# Patient Record
Sex: FEMALE | Race: OTHER | Employment: UNEMPLOYED | URBAN - METROPOLITAN AREA
[De-identification: names, ages, dates, MRNs, and addresses within clinical notes are randomized per-mention and may not be internally consistent; named-entity substitution may affect disease eponyms.]

---

## 2022-09-23 ENCOUNTER — HOSPITAL ENCOUNTER (INPATIENT)
Age: 32
LOS: 20 days | Discharge: HOME | DRG: 885 | End: 2022-10-13
Attending: PSYCHIATRY & NEUROLOGY | Admitting: PSYCHIATRY & NEUROLOGY
Payer: MEDICAID

## 2022-09-23 DIAGNOSIS — F31.2 BIPOLAR AFFECTIVE DISORDER, CURRENT EPISODE MANIC WITH PSYCHOTIC SYMPTOMS (HCC): Primary | ICD-10-CM

## 2022-09-23 PROCEDURE — 1240000000 HC EMOTIONAL WELLNESS R&B

## 2022-09-23 RX ORDER — OLANZAPINE 5 MG/1
5 TABLET ORAL EVERY 4 HOURS PRN
Status: DISCONTINUED | OUTPATIENT
Start: 2022-09-23 | End: 2022-09-28

## 2022-09-23 RX ORDER — POLYETHYLENE GLYCOL 3350 17 G
2 POWDER IN PACKET (EA) ORAL
Status: DISCONTINUED | OUTPATIENT
Start: 2022-09-23 | End: 2022-10-14 | Stop reason: HOSPADM

## 2022-09-23 RX ORDER — DIPHENHYDRAMINE HYDROCHLORIDE 50 MG/ML
50 INJECTION INTRAMUSCULAR; INTRAVENOUS EVERY 4 HOURS PRN
Status: DISCONTINUED | OUTPATIENT
Start: 2022-09-23 | End: 2022-10-02

## 2022-09-23 RX ORDER — HYDROXYZINE 50 MG/1
50 TABLET, FILM COATED ORAL 3 TIMES DAILY PRN
Status: DISCONTINUED | OUTPATIENT
Start: 2022-09-23 | End: 2022-09-23

## 2022-09-23 RX ORDER — MAGNESIUM HYDROXIDE/ALUMINUM HYDROXICE/SIMETHICONE 120; 1200; 1200 MG/30ML; MG/30ML; MG/30ML
30 SUSPENSION ORAL EVERY 6 HOURS PRN
Status: DISCONTINUED | OUTPATIENT
Start: 2022-09-23 | End: 2022-10-14 | Stop reason: HOSPADM

## 2022-09-23 RX ORDER — LORAZEPAM 1 MG/1
1 TABLET ORAL ONCE
Status: DISCONTINUED | OUTPATIENT
Start: 2022-09-23 | End: 2022-09-23

## 2022-09-23 RX ORDER — LORAZEPAM 2 MG/1
2 TABLET ORAL 2 TIMES DAILY
Status: DISCONTINUED | OUTPATIENT
Start: 2022-09-23 | End: 2022-10-13

## 2022-09-23 RX ORDER — TRAZODONE HYDROCHLORIDE 50 MG/1
50 TABLET ORAL NIGHTLY PRN
Status: DISCONTINUED | OUTPATIENT
Start: 2022-09-23 | End: 2022-09-24

## 2022-09-23 RX ORDER — ACETAMINOPHEN 325 MG/1
650 TABLET ORAL EVERY 4 HOURS PRN
Status: DISCONTINUED | OUTPATIENT
Start: 2022-09-23 | End: 2022-09-23

## 2022-09-23 RX ORDER — ACETAMINOPHEN 325 MG/1
650 TABLET ORAL EVERY 4 HOURS PRN
Status: DISCONTINUED | OUTPATIENT
Start: 2022-09-23 | End: 2022-10-14 | Stop reason: HOSPADM

## 2022-09-23 RX ORDER — IBUPROFEN 400 MG/1
400 TABLET ORAL EVERY 6 HOURS PRN
Status: DISCONTINUED | OUTPATIENT
Start: 2022-09-23 | End: 2022-09-23

## 2022-09-23 ASSESSMENT — LIFESTYLE VARIABLES
HOW OFTEN DO YOU HAVE A DRINK CONTAINING ALCOHOL: PATIENT DECLINED
HOW MANY STANDARD DRINKS CONTAINING ALCOHOL DO YOU HAVE ON A TYPICAL DAY: PATIENT DECLINED

## 2022-09-23 ASSESSMENT — PAIN SCALES - WONG BAKER: WONGBAKER_NUMERICALRESPONSE: 0

## 2022-09-23 NOTE — BH NOTE
It was reported to writer by Nimisha Avalos RN that [facility] called the facility, stating that they do not have any interpreters available.

## 2022-09-23 NOTE — BH NOTE
Sandra remains intermittently upset. She took a nap for about 1 hour this afternoon. She ate a few bites of her lunch. She will not talk to staff even using the . She yells that she doesn't want to be here. Pt became upset when making a call and it went to voice mail. She began to wail loudly. Staff unable to console her. Call placed to pts  and she accepted it. She began to yell at him. Once done her  spoke with writer and he states she is upset that she has no towels or shampoo in her room and she wants to shower. Everything was provided for personal care and pt was calm. She refused her clothing because they are \"not clean. \" Her  stated he would be in, but after visiting hours. He arrived after 1630 and he and pt are in [employer]. She yells at times, but is calm now. She continues to refuse her vital signs and assessment.

## 2022-09-23 NOTE — BH NOTE
Pt arrived via stretcher at 0945. She looked around suspiciously and began to cry. She speaks little [place] and ambulance personnel was using a translation neto to talk with her. She told them she was afraid to be here and began to cry. Attempts to calm the pt were ineffective. The  was utilized to speak to her. Through  #[id number] The pt was able to say that she didn't know why she was here. She states she was asleep when the police came and took her to the hospital. She states nothing is wrong with her. She did eventually talk some. She states she is from [facility], is  and has 2 boys age 6 and 15. She continues to say she didn't want to be here, but was more calm. She has been walking around the unit, not interactive. She was offered a breakfast tray and did sit to eat it. She declined to answer anymore questions and efforts are being made to get an  to come in and sit with her and assist with the admission process. She does deny voices or visual hallucinations. She denies any thoughts of harming  herself or others.

## 2022-09-23 NOTE — H&P
Hospital Medicine History & Physical      PCP: No primary care provider on file. Date of Admission: 9/23/2022    Date of Service: Pt seen/examined on 9/27/2022    Chief Complaint:  No chief complaint on file. History Of Present Illness: The patient is a 27 y.o. [address]  speaking female with pmhx of bipolar disorder who presented to [facility] by  for concern because patient has no slept in 3 days, has been off of her medications, and has been having hallucinations. Patient was seen and evaluated in the ED by the ED medical provider, patient was medically cleared for admission to [facility]. This note serves as an admission medical H&P. Tobacco use: None   ETOH use: None  Illicit drug use: None    Patient denies any medical complaints     Past Medical History:    No past medical history on file. Past Surgical History:    No past surgical history on file. Medications Prior to Admission:    Prior to Admission medications    Not on File       Allergies:  Patient has no allergy information on record. Social History:      TOBACCO:   has no history on file for tobacco use. ETOH:   has no history on file for alcohol use. Family History:   Positive as follows:    No family history on file. REVIEW OF SYSTEMS:       Constitutional: Negative for fever   HENT: Negative for sore throat   Eyes: Negative for redness   Respiratory: Negative  for dyspnea, cough   Cardiovascular: Negative for chest pain   Gastrointestinal: Negative for vomiting, diarrhea   Genitourinary: Negative for hematuria   Musculoskeletal: Negative for arthralgias   Skin: Negative for rash   Neurological: Negative for syncope    Hematological: Negative for easy bruising/bleeding   Psychiatric/Behavorial: Per psychiatry team evaluation     PHYSICAL EXAM:    There were no vitals taken for this visit. Gen: No distress. Alert. Eyes: PERRL. No sclera icterus. No conjunctival injection. Neck: No JVD. No Carotid Bruit. Trachea midline. GI: Non-tender. Non-distended. Skin: Warm and dry. No nodule on exposed extremities. No rash on exposed extremities. M/S: No cyanosis. No joint deformity. No clubbing. Moving all 4 extremities appropriately   Neuro: Awake. No focal neurologic deficit on exam.  Cranial nerves II through XII intact. Patient is able to ambulate without difficulty. Psych: Per psychiatry team evaluation   Patient refused to let me auscultate her heart, lungs, or abdomen     Labs in paper chart reviewed:  UDS + benzos   CMP: Potassium 3.3  CBC WNLs  Hgb A1C 5.3  HIV neg   TSH 1.2  T4 1.28    CULTURES  COVID not detected    EKG:  Not obtained     RADIOLOGY  No orders to display         ASSESSMENT/PLAN:  #Bipolar disorder   #Depression   #Psychosis   - per psychiatry team    #Hypokalemia   -will attempt to repeat BMP, patient has been refusing labs       Pt has no medical complaints at this time. They were informed that should a medical concern arise during their admission they may have I contact us.         Mabel Beavers   9/23/2022 12:25 PM
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
## 2022-09-23 NOTE — BH NOTE
Pt at nurses station yelling and throwing items off the counter top, including handouts, and then grabbed a meal tray and pushed it off the counter. Counter cleared for patient's and staff safety.

## 2022-09-23 NOTE — BH NOTE
Called [facility] ([phone]) to request in person , they state they will call back with an update as to when an  may be available.

## 2022-09-24 PROBLEM — E87.1 HYPONATREMIA: Status: ACTIVE | Noted: 2022-09-24

## 2022-09-24 PROBLEM — E87.6 HYPOKALEMIA: Status: ACTIVE | Noted: 2022-09-24

## 2022-09-24 PROBLEM — F31.2 BIPOLAR AFFECTIVE DISORDER, MANIC, SEVERE, WITH PSYCHOTIC BEHAVIOR (HCC): Status: ACTIVE | Noted: 2022-09-24

## 2022-09-24 PROCEDURE — 1240000000 HC EMOTIONAL WELLNESS R&B

## 2022-09-24 RX ORDER — QUETIAPINE 200 MG/1
200 TABLET, FILM COATED, EXTENDED RELEASE ORAL NIGHTLY
Status: DISCONTINUED | OUTPATIENT
Start: 2022-09-24 | End: 2022-10-01

## 2022-09-24 ASSESSMENT — PAIN SCALES - WONG BAKER: WONGBAKER_NUMERICALRESPONSE: 0

## 2022-09-24 NOTE — PROGRESS NOTES
Behavioral Services  Medicare Certification Upon Admission    I certify that this patient's inpatient psychiatric hospital admission is medically necessary for:    [x] (1) Treatment which could reasonably be expected to improve this patient's condition,       [x] (2) Or for diagnostic study;     AND     [x](2) The inpatient psychiatric services are provided while the individual is under the care of a physician and are included in the individualized plan of care.     Estimated length of stay/service 8-10 days    Plan for post-hospital care incomplete     Electronically signed by Ascencion Nicholas MD on 9/24/2022 at 10:53 AM
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
## 2022-09-24 NOTE — BH NOTE
Writer and other staff made multiple attempts to communicate with patient through tele- and live  with no success. Patient refuses to eat or drink, or take medication. Has had a few sips of liquid. Has wrapped self in blanket and remained self isolative on milieu. Refuses to speak with family.   Writer unable to complete assessments or interview with patient due to patient's refusal.

## 2022-09-24 NOTE — FLOWSHEET NOTE
Nurse approached patient upon beginning of the shift and patient walked away and would not make any eye-contact presenting guarded and watchful with a staring expression. Patient does not appear to be responding to external stimuli. Patient does appear very paranoid and easily startled by any contact. She does not initiate  social interaction with staff or peers. At a later time this nurse attempted to give the patient her scheduled Ativan medication. The patient said No in a loud voice and walked a way. Thirty minutes later the nurse attempted to administer the po Ativan and patient turned her head and said no. Patient is seen sitting on the periphery of the dayroom with a blanket over her head. This nurse consulted with charge nurse regarding her care and the  resource was utilized.  # [id number] spoke at length with the patient in [place] which sounded very intense. The patient would speak rapidly and loudly at times in an angry tone at the  sometimes pushing the machine on wheels away from her. The patient would not agree to take the medication. The attempt to communicated was aborted as the patient seemed to be escalating with the stimulus. The  shared that he thought he was making progress \"then she flipped on me talking about the police and the hospital accusing her of stealing\". He shared that he thought she seemed threatened but it was not clear to him what was exactly bothering her. However, he said that her communication was basically a flight of ideas that he was having a hard time following even with understanding [place]. Patient continues to sit quietly in the dayroom with a blanket over her head. Close observation and monitoring continues.
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
## 2022-09-24 NOTE — H&P
Psychiatry History and Physical     Admission Date:    9/23/2022    Identification: domiciled, , and unemployed German speaking 30yo with a chart history of bipolar I disorder and psychotic symptoms who was brought to  [facility]  under a Statement of Belief by police after her  called the [facility] because of worsening psychotic behavior. SOI:  patient. Record review (some [facility] and  record; otherwise none). CC: psychotic symptoms     HPI:   According to the [facility] notes:  Her  called reporting that the patient had not slept in days, was paranoid and fearful, and hallucinating. He told them she was afraid people may harm her. He said she had been taking herbal supplements from her mother but no other treatment for several weeks. She was unwilling to go with him to the ER so he called the [facility]. Ultimately she was brought to  by police. When the  [facility] tried to meet with her she became fearful and declined to speak with them. She tried to prevent her  from speaking with them too. She became tearful and cried \"no, no, no!!\" She was \"selectively mute\" and \"showing signs of catatonia. \"    According to our admitting RN note:  Pt arrived via stretcher at 0945. She looked around suspiciously and began to cry. She speaks little [place] and ambulance personnel was using a translation neto to talk with her. She told them she was afraid to be here and began to cry. Attempts to calm the pt were ineffective. The  was utilized to speak to her. Through  #[id number] The pt was able to say that she didn't know why she was here. She states she was asleep when the police came and took her to the hospital. She states nothing is wrong with her. She did eventually talk some. She states she is from [facility], is  and has 2 boys age 6 and 15. She continues to say she didn't want to be here, but was more calm. She has been walking around the unit, not interactive.  She was offered a breakfast tray and did sit to eat it. She declined to answer anymore questions and efforts are being made to get an  to come in and sit with her and assist with the admission process. She does deny voices or visual hallucinations. She denies any thoughts of harming  herself or others. Later in the day yesterday:  Ghislaine Sands remains intermittently upset. She took a nap for about 1 hour this afternoon. She ate a few bites of her lunch. She will not talk to staff even using the . She yells that she doesn't want to be here. Pt became upset when making a call and it went to voice mail. She began to wail loudly. Staff unable to console her. Call placed to pts  and she accepted it. She began to yell at him. Once done her  spoke with writer and he states she is upset that she has no towels or shampoo in her room and she wants to shower. Everything was provided for personal care and pt was calm. She refused her clothing because they are \"not clean. \" Her  stated he would be in, but after visiting hours. He arrived after 1630 and he and pt are in [employer]. She yells at times, but is calm now. She continues to refuse her vital signs and assessment. I attempted to meet with her yesterday and this morning. She declined to speak with me, looked fearful, and walked away. She has been behavior stable since yesterday afternoon. She has not accepted medication since admission. 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 Past Medical History:  Past Medical History:   Diagnosis Date    Incomplete miscarriage 2016    Paranoia (psychosis) ([facility] [address] 2021    Spontaneous  in first trimester 2016    Per  [facility] Hx     No known chronic conditions, TBIs, or seizures     Home Medications:  Unknown.     Chemical Dependency History:   Unknown. Her UDS in the ER was positive for benzos (she had been given some). Family Mental Health Hx:    Unknown     Social Hx:   Lives with , 2 children, and brother. ROS:  no spontaneous complaints. PE:    BP (!) 125/102 Comment: Pt moving and yelling at spouse  Pulse 81   Temp 98.1 °F (36.7 °C) (Oral)   Resp 18   LMP  (LMP Unknown)       Motor / Gait:  normal gait and station  AIMS: 0    Mental Status Examination:    Appearance: fair hygiene  Behavior/Attitude toward examiner:  fearful   Speech: poverty   Mood:  elevated  Affect:  elevated  Thought processes:  unable to assess   Thought Content: + paranoid. Perceptions: ? AVH, ? RTIS  Attention: impaired  Abstraction: unable to assess   Cognition: unable to assess   Insight: impaired  Judgment: impaired    LAB: [facility] labs show a BMP with K at 3.3, otherwise WNL. UDS positive for benzos. TSH and T4 WNL. HIV negative. CBC WNL. LFTs WNL. COVID negative. Formulation: domiciled, , and unemployed Maldivian speaking 30yo with a chart history of bipolar I disorder and psychotic symptoms who was brought to [facility]  under a Statement of Belief by police after her  called the [facility] because of worsening psychotic behavior. She was transferred here on a hold for further evaluation, stabilization, and treatment    Dx:   Primary Psychiatric (DSM V) Diagnosis: Bipolar affective disorder, current episode manic with psychotic symptoms   Secondary Psychiatric (DSM V) Diagnoses: none  Chemical Dependency Diagnoses: none known  Medical: hypokalemia     Plan:  1. Admit to inpatient psychiatry for further evaluation, stabilization, and treatment. 2. Order ativan 2mg BID and quetiapine XR 200mg QHS. Order q15min checks for safety, programming, and prn medication for anxiety, agitation, or insomnia. 3. Hospitalist consult for admission. 4. Request complete [facility] records. Collateral from  Alberteen Nissen - [phone].   5. ELOS 8-10 days. Admitted on a hold that expires on Tuesday 9/27. Spent > 70 minutes face to face with patient of which >50% was spent counseling and providing education regarding diagnosis, treatment options, and prognosis.     Daniel Bejarano MD  Staff Psychiatrist

## 2022-09-25 PROCEDURE — 1240000000 HC EMOTIONAL WELLNESS R&B

## 2022-09-25 NOTE — BH NOTE
Patient initially in shower afraid to come out then returned to bed. Patient has refused drinks and food even with 's encouragement. Patient suspicious and fearful.  reported that sometimes Mom can encourage her but patient refuses to talk to Mom at this time.

## 2022-09-25 NOTE — PLAN OF CARE
Problem: Psychosis  Goal: Will report no hallucinations or delusions  Description: INTERVENTIONS:  1. Administer medication as  ordered  2. Assist with reality testing to support increasing orientation  3. Assess if patient's hallucinations or delusions are encouraging self harm or harm to others and intervene as appropriate  9/25/2022 1240 by Eb Gilbert RN  Outcome: Not Progressing     Problem: Sleep Disturbance  Goal: Will exhibit normal sleeping pattern  Description: INTERVENTIONS:  1. Administer medication as ordered  2. Decrease environmental stimuli, including noise, as appropriate  3. Discourage social isolation and naps during the day  9/25/2022 1240 by Eb Gilbert RN  Outcome: Progressing    Problem: Pain  Goal: Verbalizes/displays adequate comfort level or baseline comfort level  9/25/2022 1240 by Eb Gilbert RN  Outcome: Progressing     Problem: Safety - Adult  Goal: Free from fall injury  9/25/2022 1240 by Eb Gilbert RN  Outcome: Progressing     Problem: Sleep Disturbance  Goal: Will exhibit normal sleeping pattern  Description: INTERVENTIONS:  1. Administer medication as ordered  2. Decrease environmental stimuli, including noise, as appropriate  3. Discourage social isolation and naps during the day  9/25/2022 1240 by Eb Gilbert RN  Outcome: Progressing    Patient is very minimally interactive with staff. Patient refusing meals and fluids. Patient pacing unit this morning. Patient been resting in bed since 0900 this morning with blanket over her head. Patient refusing medications, vitals, and assessment. Patient disclosed to  that she would like to talk to her father. Unable to give his contact information and she didn't know if he was still alive.

## 2022-09-25 NOTE — BH NOTE
Purposeful Rounding    Patient Location: Patient room    Patient willing to engage in conversation: No - patient resting    Presentation/behavior: N/A    Affect: N/A    Concerns reported: N/A    PRN medications given: N/A    Environmental assessment: No safety hazards noted    Fall prevention interventions in place: Lighting appropriate, Room free of clutter, and Clear path to bathroom
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
## 2022-09-25 NOTE — PLAN OF CARE
Problem: Psychosis  Goal: Will report no hallucinations or delusions  Description: INTERVENTIONS:  1. Administer medication as  ordered  2. Assist with reality testing to support increasing orientation  3. Assess if patient's hallucinations or delusions are encouraging self harm or harm to others and intervene as appropriate  Outcome: Not Progressing     Problem: Sleep Disturbance  Goal: Will exhibit normal sleeping pattern  Description: INTERVENTIONS:  1. Administer medication as ordered  2. Decrease environmental stimuli, including noise, as appropriate  3. Discourage social isolation and naps during the day  Outcome: Not Progressing   Pt speaks no English that staff is aware of. Even with the , Sydni Carpio, pt answers very little. Pt voicing no c/o physical pain. Pt does not appear to be in physical pain. FLACC 0. Pt has demonstrated no fall risk. Her gait has been steady all the time she has been walking on the unit. Psychosis     Pt has been at times appearing very preoccupied. Pt would stare into this nurses' eyes like she was trying to convey something, but would not answer much from the . On days today she did tell Sydni Carpio that she wanted to leave here and was afraid. She refused all meds. She has not been eating or drinking this shift when observed. Pt stood at her window for about an hour and a half. She then finally laid down in bed. She had a cover over her head and torso but did not cover her face. Pt given clean panties and pads. Will make sure that bottled water and snacks stay in room.

## 2022-09-26 LAB
GLUCOSE BLD-MCNC: 80 MG/DL (ref 70–99)
PERFORMED ON: NORMAL

## 2022-09-26 PROCEDURE — 1240000000 HC EMOTIONAL WELLNESS R&B

## 2022-09-26 ASSESSMENT — PAIN SCALES - WONG BAKER: WONGBAKER_NUMERICALRESPONSE: 0

## 2022-09-26 NOTE — PLAN OF CARE
Attempted to see patient, she refused at this time.  Will reattempt at a later date     Reford Rishi, [id number]  9/23/2022

## 2022-09-26 NOTE — PLAN OF CARE
Pt has been in bed asleep most of the day. She appeared to be holding her eyes closed at times. When writer could not get her to wake up, a fingerstick was done. The reading was 80 mg/dl. She didn't open her eyes with the lancet stick, but scowled when staff touched the sole of her foot. Later she was noted to be crying in her room. Writer went in and pt allowed her hair to be stroked and for writer to hold her hand. When she stopped crying, writer took her tray in. She refused to eat. The tray was left with her and she threw it on the floor. She got up and went to the bathroom. When up she was noted to be on her period. She refused to change her gown. She was given panties and dulce pads, but threw them into the anderson. Several staff have tried to arevalo her gown and she refuses. When given a clean gown, she threw it into the anderson. We were unable to get an  for day shift, but will have one this evening about 1930. Call placed to her  and he was unable to come in this evening. [employer] phone taken to pt and she refused to speak to her . She again refused the iPad . The  Gadiel Guillermo just showed up. The pt is wandering the unit. She got a milk from the fridge, opened it, then put it back. She eventually got an apple juice, but has not attempted to open it. She was told about her gown and she refuses to change, she shrugged her shoulders. She was offered other food, and snacks. She has refused so far. Gadiel Li is walking around the unit and talking to her at this time. He will encourage her to eat and drink and to change her gown. She has not been noted to be RTIS. Problem: Pain  Goal: Verbalizes/displays adequate comfort level or baseline comfort level  9/26/2022 1843 by Gilson Chavez RN  Outcome: Not Progressing  9/26/2022 1020 by Marijean Mohs, RN  Outcome: Progressing     Problem: Psychosis  Goal: Will report no hallucinations or delusions  Description: INTERVENTIONS:  1. Administer medication as  ordered  2. Assist with reality testing to support increasing orientation  3. Assess if patient's hallucinations or delusions are encouraging self harm or harm to others and intervene as appropriate  9/26/2022 1843 by El Sethi RN  Outcome: Not Progressing  9/26/2022 1020 by Guru Mascorro RN  Outcome: Not Progressing     Problem: Sleep Disturbance  Goal: Will exhibit normal sleeping pattern  Description: INTERVENTIONS:  1. Administer medication as ordered  2. Decrease environmental stimuli, including noise, as appropriate  3.  Discourage social isolation and naps during the day  9/26/2022 1843 by El Sethi RN  Outcome: Not Progressing  9/26/2022 1020 by Guru Mascorro RN  Outcome: Progressing

## 2022-09-26 NOTE — PROGRESS NOTES
Department of Psychiatry  AttendingProgress Note  Chief Complaint: psychosis  Estela Blevins was in bed when I met with her. She made minimal eye contact and had a blanket pulled up to her mid face. She made no effort to engage and appeared to be anxious. Is not willing to use the  screen. Awaiting an  on the unit. No outbursts since last Friday 9/23. Patient's chart was reviewed and collaborated with  about the treatment plan. SUBJECTIVE:    Patient is feeling unchanged. Suicidal ideation:  DEVIN. Patient  DEVIN  medication side effects. ROS: Patient has new complaints: no  Sleeping adequately:  No: DEVIN   Appetite adequate: Yes  Attending groups: No:   Visitors:No    OBJECTIVE    Physical  VITALS:  BP (!) 125/102 Comment: Pt moving and yelling at spouse  Pulse 81   Temp 98.1 °F (36.7 °C) (Oral)   Resp 16   LMP  (LMP Unknown)     Mental Status Examination:  Patients appearance was hospital attire. Thoughts are  DEVIN . Homicidal ideations DEVIN. No abnormal movements, tics or mannerisms. Memory DEVIN Aims 0. Concentration Unable to Assess. Alert and oriented X 4. Insight and Judgement DEVIN. Patient was uncooperative. Patient gait normal. Mood labile, affect labile affect Hallucinations DEVIN, suicidal ideations no specific plan to harm self Speech DEVIN  Data  Labs:   No results found for any previous visit.             Medications  Current Facility-Administered Medications: QUEtiapine (SEROQUEL XR) extended release tablet 200 mg, 200 mg, Oral, Nightly  magnesium hydroxide (MILK OF MAGNESIA) 400 MG/5ML suspension 30 mL, 30 mL, Oral, Daily PRN  nicotine polacrilex (COMMIT) lozenge 2 mg, 2 mg, Oral, Q1H PRN  aluminum & magnesium hydroxide-simethicone (MAALOX) 200-200-20 MG/5ML suspension 30 mL, 30 mL, Oral, Q6H PRN  OLANZapine (ZYPREXA) tablet 5 mg, 5 mg, Oral, Q4H PRN **OR** OLANZapine (ZYPREXA) 10 mg in sterile water 2 mL injection, 10 mg, IntraMUSCular, Q4H PRN  diphenhydrAMINE (BENADRYL) injection 50 mg, 50 mg, IntraMUSCular, Q4H PRN  acetaminophen (TYLENOL) tablet 650 mg, 650 mg, Oral, Q4H PRN  LORazepam (ATIVAN) tablet 2 mg, 2 mg, Oral, BID    ASSESSMENT AND PLAN    Principal Problem:    Bipolar affective disorder, current episode manic with psychotic symptoms (HCC)  Active Problems:    Hypokalemia  Resolved Problems:    * No resolved hospital problems. *       1. Patient s symptoms   show no change  2. Probable discharge is unclear  3. Discharge planning is incomplete  4. Suicidal ideation is  DEVIN  5. Total time with patient was 40 minutes and more than 50 % of that time was spent counseling the patient on their symptoms, treatment and expected goals.

## 2022-09-26 NOTE — BH NOTE
Pt sleeping. Met with , Deepak Stock with , [facility]. Deepak Stock seems to speak only a few words of English, but was very cooperative and seemed to want to give info to help his wife.  said Hyun Maza had been off her meds for 5-6 months. Pt has two earlier episodes of apparent psychosis. These seemed centered on postpartum periods, but pt also had an . Pt would get very paranoid and fearful about nothing that was evident. This episode seemed to start about a week ago pt started to get very clingy. States his wife has abandonment issues. (This may be in part that father to whom she was close walked out on their family about 21 years ago.) Naomy said when this happened in the past, she seems to come around after a brief time and starts taking her meds. Asked about her favorite foods, and he said Tacos, [place] food, Coke and Gatorade. States pt has hx of suicidal behaviors or threats. No past attempts. Deepak Stock gave us several phone numbers. Psychiatrist on Bette Crocker with   [phone] # [phone]  Pt's mother Syd Oviedo" in [address]  Mauricio Danger only [address] speaking  [phone]  Bandar speaks a little English   [phone]  The , Joann Owen has been here left 2 of his numbers [phone] and [phone]. He could come back Monday afternoon when able. We would have to go through his agency.

## 2022-09-26 NOTE — PLAN OF CARE
take medication as prescribed, eat 75% of meals, attend groups, participate in milieu activities, participate in treatment team and care planning for discharge and follow up.            Mayra Cummins RN

## 2022-09-26 NOTE — PLAN OF CARE
This is night shift 9-25-22 to 9-26-22  Problem: Pain  Goal: Verbalizes/displays adequate comfort level or baseline comfort level  Outcome: Progressing     Problem: Safety - Adult  Goal: Free from fall injury  Outcome: Progressing     Problem: Psychosis  Goal: Will report no hallucinations or delusions  Description: INTERVENTIONS:  1. Administer medication as  ordered  2. Assist with reality testing to support increasing orientation  3. Assess if patient's hallucinations or delusions are encouraging self harm or harm to others and intervene as appropriate  Outcome: Not Progressing     Problem: Sleep Disturbance  Goal: Will exhibit normal sleeping pattern  Description: INTERVENTIONS:  1. Administer medication as ordered  2. Decrease environmental stimuli, including noise, as appropriate  3. Discourage social isolation and naps during the day  Outcome: Progressing     Problem: Psychosis  Goal: Will report no hallucinations or delusions  Description: INTERVENTIONS:  1. Administer medication as  ordered  2. Assist with reality testing to support increasing orientation  3. Assess if patient's hallucinations or delusions are encouraging self harm or harm to others and intervene as appropriate  Outcome: Not Progressing   Pt has had no falls and appears to have a steady gait. Pt says very little. She appears at times to be responding but answers few questions and then an  must be present. Pt remains fearful though this seems to be getting better. Pt was tearful for a while but we were unable to find out why. Pt did not eat her dinner tray. She did drink about 3/4 of a bottled water, that was brought with a cup of ice. Pt readily drank the water over the ice that was brought to her. Did not see pt eating this shift, but does have snacks in room. Over all pt seems slightly more responsive. She refused VS early in shift and did not want to take HS meds. She did seem a bit more comfortable in her surroundings.

## 2022-09-27 LAB
ANION GAP SERPL CALCULATED.3IONS-SCNC: 18 MMOL/L (ref 3–16)
BASOPHILS ABSOLUTE: 0.2 K/UL (ref 0–0.2)
BASOPHILS RELATIVE PERCENT: 2.7 %
BUN BLDV-MCNC: 13 MG/DL (ref 7–20)
CALCIUM SERPL-MCNC: 9.6 MG/DL (ref 8.3–10.6)
CHLORIDE BLD-SCNC: 105 MMOL/L (ref 99–110)
CO2: 17 MMOL/L (ref 21–32)
CREAT SERPL-MCNC: <0.5 MG/DL (ref 0.6–1.1)
EOSINOPHILS ABSOLUTE: 0 K/UL (ref 0–0.6)
EOSINOPHILS RELATIVE PERCENT: 0.6 %
GFR AFRICAN AMERICAN: >60
GFR NON-AFRICAN AMERICAN: >60
GLUCOSE BLD-MCNC: 95 MG/DL (ref 70–99)
HCT VFR BLD CALC: 40 % (ref 36–48)
HEMOGLOBIN: 13.3 G/DL (ref 12–16)
LYMPHOCYTES ABSOLUTE: 1.4 K/UL (ref 1–5.1)
LYMPHOCYTES RELATIVE PERCENT: 17.4 %
MCH RBC QN AUTO: 29.3 PG (ref 26–34)
MCHC RBC AUTO-ENTMCNC: 33.3 G/DL (ref 31–36)
MCV RBC AUTO: 87.9 FL (ref 80–100)
MONOCYTES ABSOLUTE: 0.5 K/UL (ref 0–1.3)
MONOCYTES RELATIVE PERCENT: 6.2 %
NEUTROPHILS ABSOLUTE: 5.7 K/UL (ref 1.7–7.7)
NEUTROPHILS RELATIVE PERCENT: 73.1 %
PDW BLD-RTO: 13.1 % (ref 12.4–15.4)
PLATELET # BLD: 315 K/UL (ref 135–450)
PMV BLD AUTO: 7.7 FL (ref 5–10.5)
POTASSIUM REFLEX MAGNESIUM: 4.1 MMOL/L (ref 3.5–5.1)
RBC # BLD: 4.55 M/UL (ref 4–5.2)
SODIUM BLD-SCNC: 140 MMOL/L (ref 136–145)
WBC # BLD: 7.8 K/UL (ref 4–11)

## 2022-09-27 PROCEDURE — 85025 COMPLETE CBC W/AUTO DIFF WBC: CPT

## 2022-09-27 PROCEDURE — 2500000003 HC RX 250 WO HCPCS: Performed by: PSYCHIATRY & NEUROLOGY

## 2022-09-27 PROCEDURE — 1240000000 HC EMOTIONAL WELLNESS R&B

## 2022-09-27 PROCEDURE — 80048 BASIC METABOLIC PNL TOTAL CA: CPT

## 2022-09-27 PROCEDURE — 2580000003 HC RX 258: Performed by: PSYCHIATRY & NEUROLOGY

## 2022-09-27 PROCEDURE — 99221 1ST HOSP IP/OBS SF/LOW 40: CPT

## 2022-09-27 PROCEDURE — 36415 COLL VENOUS BLD VENIPUNCTURE: CPT

## 2022-09-27 RX ADMIN — OLANZAPINE 10 MG: 10 INJECTION, POWDER, LYOPHILIZED, FOR SOLUTION INTRAMUSCULAR at 18:31

## 2022-09-27 NOTE — PLAN OF CARE
Problem: Safety - Adult  Goal: Free from fall injury  9/26/2022 2040 by Alis Carias RN  Outcome: Not Progressing     Problem: Psychosis  Goal: Will report no hallucinations or delusions  Description: INTERVENTIONS:  1. Administer medication as  ordered  2. Assist with reality testing to support increasing orientation  3. Assess if patient's hallucinations or delusions are encouraging self harm or harm to others and intervene as appropriate  9/26/2022 2040 by Alis Carias RN  Outcome: Not Progressing         Problem: Psychosis  Goal: Will report no hallucinations or delusions  Description: INTERVENTIONS:  1. Administer medication as  ordered  2. Assist with reality testing to support increasing orientation  3. Assess if patient's hallucinations or delusions are encouraging self harm or harm to others and intervene as appropriate  9/26/2022 2040 by Alis Carias RN  Outcome: Not Progressing  9/26/2022 1843 by Avery Mirza RN  Outcome: Not Progressing  9/26/2022 1020 by Angela Chen RN  Outcome: Not Progressing     Problem: Sleep Disturbance  Goal: Will exhibit normal sleeping pattern  Description: INTERVENTIONS:  1. Administer medication as ordered  2. Decrease environmental stimuli, including noise, as appropriate  3.  Discourage social isolation and naps during the day  9/26/2022 1843 by Avery Mirza RN  Outcome: Not Progressing

## 2022-09-27 NOTE — BH NOTE
Writer filed probate on this patient.     Writer sent medical records request to [facility] DR EFREN LUJAN at [phone]
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
## 2022-09-27 NOTE — PROGRESS NOTES
Department of Psychiatry  AttendingProgress Note  Chief Complaint: catatonia  Mary Gee continues to resist tx. She refused medication and refused to allow hospitalist to do an eval. I also sat in with the green screen and hospitalist and Mary Gee would shake her head yes or no to quesitons. When I asked her about taking medication she shook her head, to imply that she agree to take meds but then when presented with it , she refused to take Ativan. She appeared to understand the . Later she walked around the unit with his arm out to her side and hands extended. Covered with a blanket. Is  not observed to be eating or drinking although there is food in her room. Refuses blood work so it's difficult to assess po intake. Appears to be walking freely about the unit . Is not confined to bed. Will pursue probate and forced med hearing. Patient's chart was reviewed and collaborated with  about the treatment plan. SUBJECTIVE:    Patient is feeling  PEPE . Suicidal ideation:  PEPE. Patient  PEPE  medication side effects. ROS: Patient has new complaints: no  Sleeping adequately:  Yes   Appetite adequate: No:   Attending groups: No:   Visitors:No    OBJECTIVE    Physical  VITALS:  BP (!) 125/102 Comment: Pt moving and yelling at spouse  Pulse 81   Temp 98.1 °F (36.7 °C) (Oral)   Resp 16   LMP  (LMP Unknown)     Mental Status Examination:  Patients appearance was . Thoughts are Blocking. Homicidal ideations PEPE. No abnormal movements, tics or mannerisms. Memory pepe Aims 0. Concentration Unable to Assess. Alert and oriented X 4. Insight and Judgement pepe. Patient was uncooperative.  Patient gait normal. Mood labile, affect labile affect Hallucinations PEPE, suicidal ideations no specific plan to harm self Speech no  speech  Data  Labs:   Admission on 09/23/2022   Component Date Value Ref Range Status    POC Glucose 09/26/2022 80  70 - 99 mg/dl Final    Performed on 09/26/2022 ACCU-CHEK Final            Medications  Current Facility-Administered Medications: QUEtiapine (SEROQUEL XR) extended release tablet 200 mg, 200 mg, Oral, Nightly  magnesium hydroxide (MILK OF MAGNESIA) 400 MG/5ML suspension 30 mL, 30 mL, Oral, Daily PRN  nicotine polacrilex (COMMIT) lozenge 2 mg, 2 mg, Oral, Q1H PRN  aluminum & magnesium hydroxide-simethicone (MAALOX) 200-200-20 MG/5ML suspension 30 mL, 30 mL, Oral, Q6H PRN  OLANZapine (ZYPREXA) tablet 5 mg, 5 mg, Oral, Q4H PRN **OR** OLANZapine (ZYPREXA) 10 mg in sterile water 2 mL injection, 10 mg, IntraMUSCular, Q4H PRN  diphenhydrAMINE (BENADRYL) injection 50 mg, 50 mg, IntraMUSCular, Q4H PRN  acetaminophen (TYLENOL) tablet 650 mg, 650 mg, Oral, Q4H PRN  LORazepam (ATIVAN) tablet 2 mg, 2 mg, Oral, BID    ASSESSMENT AND PLAN    Principal Problem:    Bipolar affective disorder, current episode manic with psychotic symptoms (HCC)  Active Problems:    Hypokalemia  Resolved Problems:    * No resolved hospital problems. *       1. Patient s symptoms   show no change  2. Probable discharge is next week  3. Discharge planning is incomplete  4. Suicidal ideation is  DEVIN  5. Total time with patient was 40 minutes and more than 50 % of that time was spent counseling the patient on their symptoms, treatment and expected goals.

## 2022-09-28 PROCEDURE — 2500000003 HC RX 250 WO HCPCS: Performed by: PSYCHIATRY & NEUROLOGY

## 2022-09-28 PROCEDURE — 2580000003 HC RX 258: Performed by: PSYCHIATRY & NEUROLOGY

## 2022-09-28 PROCEDURE — 1240000000 HC EMOTIONAL WELLNESS R&B

## 2022-09-28 RX ORDER — DIAZEPAM 5 MG/ML
10 INJECTION, SOLUTION INTRAMUSCULAR; INTRAVENOUS EVERY 4 HOURS PRN
Status: DISCONTINUED | OUTPATIENT
Start: 2022-09-28 | End: 2022-10-02

## 2022-09-28 RX ORDER — OLANZAPINE 10 MG/1
10 TABLET ORAL EVERY 4 HOURS PRN
Status: DISCONTINUED | OUTPATIENT
Start: 2022-09-28 | End: 2022-10-02

## 2022-09-28 RX ADMIN — OLANZAPINE 10 MG: 10 INJECTION, POWDER, LYOPHILIZED, FOR SOLUTION INTRAMUSCULAR at 00:24

## 2022-09-28 ASSESSMENT — PAIN SCALES - WONG BAKER: WONGBAKER_NUMERICALRESPONSE: 0

## 2022-09-28 NOTE — PLAN OF CARE
Accessed Pt with use of  and Pt denied all. Pt did not want to talk much and refused all meds. Pt slept at beginning of shift. Pt in & out of room for most of the remaining shift. Problem: Safety - Adult  Goal: Free from fall injury  Outcome: Progressing     Problem: Pain  Goal: Verbalizes/displays adequate comfort level or baseline comfort level  Outcome: Not Progressing     Problem: Psychosis  Goal: Will report no hallucinations or delusions  Description: INTERVENTIONS:  1. Administer medication as  ordered  2. Assist with reality testing to support increasing orientation  3. Assess if patient's hallucinations or delusions are encouraging self harm or harm to others and intervene as appropriate  Outcome: Not Progressing     Problem: Sleep Disturbance  Goal: Will exhibit normal sleeping pattern  Description: INTERVENTIONS:  1. Administer medication as ordered  2. Decrease environmental stimuli, including noise, as appropriate  3.  Discourage social isolation and naps during the day  Outcome: Not Progressing

## 2022-09-28 NOTE — PLAN OF CARE
Andreia Elizabeth continues to refuse food and fluids. She agreed to a soda this evening then refused when it was given to her. She is suspicious and will not answer most questions. She denied SI/HI and states \"nada\" to every question. Staff use  to offer any food. Staff has offered to go to any restaurant near here and bring back the food of her choice and she refuses. She is educated that her body will become sick if she continues to not eat. She continues to refuse. She changed into her own clothes for a few minutes, then back into hospital gown. She does not appear to be RTIS. Problem: Psychosis  Goal: Will report no hallucinations or delusions  Description: INTERVENTIONS:  1. Administer medication as  ordered  2. Assist with reality testing to support increasing orientation  3. Assess if patient's hallucinations or delusions are encouraging self harm or harm to others and intervene as appropriate  Outcome: Not Progressing     Problem: Sleep Disturbance  Goal: Will exhibit normal sleeping pattern  Description: INTERVENTIONS:  1. Administer medication as ordered  2. Decrease environmental stimuli, including noise, as appropriate  3.  Discourage social isolation and naps during the day  Outcome: Not Progressing

## 2022-09-28 NOTE — PROGRESS NOTES
Department of Psychiatry  AttendingProess Note  Chief Complaint: psychosis  Last evening Sandra was pacing the unit and screamed. Medication was offered and she refused. Eventually given Zyprexa 10 gm IM and she continued to cry. Noncompliant with ipad and . Today she was seen along with the  and she was resistant to talking and responded with a \"no\" for most questions. She did not walk away and stood in her doorway and listening to us. She responded with minimal words when she did respond. She is disoriented to place, reason for being hospitalized. She stated that she drinks and eats a little but would not be specific with any responses. She did change clothes for the first time. Probate has been set for tomorrow and she continues to not eat/drink or cooperate with treatment and since we have no blood work due to her refusal, it is difficult to know her metabolic status. Will most likely require forced med hearing. Patient's chart was reviewed and collaborated with  about the treatment plan. SUBJECTIVE:    Patient is feeling  DEVIN . Suicidal ideation:  DEVIN. Patient  DEVIN  medication side effects. ROS: Patient has new complaints: no  Sleeping adequately:  Yes   Appetite adequate: No:   Attending groups: No:   Visitors:No    OBJECTIVE    Physical  VITALS:  BP (!) 125/102 Comment: Pt moving and yelling at spouse  Pulse 99   Temp 98.1 °F (36.7 °C) (Oral)   Resp 18   LMP  (LMP Unknown)     Mental Status Examination:  Patients appearance was ill-appearing. Thoughts are Blocking. Homicidal ideations none. No abnormal movements, tics or mannerisms. Memory DEVIN Aims 0. Concentration Unable to Assess. Alert and oriented X 4. Insight and Judgement DEVIN. Patient was uncooperative.  Patient gait normal. Mood irritable, affect labile affect Hallucinations DEVIN, suicidal ideations no specific plan to harm self Speech loud  Data  Labs:   Admission on 09/23/2022   Component Date 09/27/2022 9.6  8.3 - 10.6 mg/dL Final            Medications  Current Facility-Administered Medications: QUEtiapine (SEROQUEL XR) extended release tablet 200 mg, 200 mg, Oral, Nightly  magnesium hydroxide (MILK OF MAGNESIA) 400 MG/5ML suspension 30 mL, 30 mL, Oral, Daily PRN  nicotine polacrilex (COMMIT) lozenge 2 mg, 2 mg, Oral, Q1H PRN  aluminum & magnesium hydroxide-simethicone (MAALOX) 200-200-20 MG/5ML suspension 30 mL, 30 mL, Oral, Q6H PRN  OLANZapine (ZYPREXA) tablet 5 mg, 5 mg, Oral, Q4H PRN **OR** OLANZapine (ZYPREXA) 10 mg in sterile water 2 mL injection, 10 mg, IntraMUSCular, Q4H PRN  diphenhydrAMINE (BENADRYL) injection 50 mg, 50 mg, IntraMUSCular, Q4H PRN  acetaminophen (TYLENOL) tablet 650 mg, 650 mg, Oral, Q4H PRN  LORazepam (ATIVAN) tablet 2 mg, 2 mg, Oral, BID    ASSESSMENT AND PLAN    Principal Problem:    Bipolar affective disorder, current episode manic with psychotic symptoms (HCC)  Active Problems:    Hypokalemia  Resolved Problems:    * No resolved hospital problems. *       1. Patient s symptoms   show no change  2. Probable discharge is next week  3. Discharge planning is incomplete  4. Suicidal ideation is  DEVIN  5. Total time with patient was 40 minutes and more than 50 % of that time was spent counseling the patient on their symptoms, treatment and expected goals.

## 2022-09-28 NOTE — PROGRESS NOTES
Pt got up out of bed and seemed to be brighter and calmer. Pt seen wandering in and out of room and shutting other pts doors and attempting to talk to them and staff. Pt stilled seemed very withdrawn/ out of it. Pt still declining food and drink.

## 2022-09-28 NOTE — PROGRESS NOTES
Pt had been quietly pacing in and out of room and around unit. Pt then began loudly coughing and was offered water which she declined. Pt then began screaming and crying loudly. Attempted to get patient to room and she became louder and more erratic in behavior. Pt offered medications but refused and told she would receive and injection and she began screaming no and growing louder. Pt held temporarily due to becoming physically agressive when approached with the medication. Injection of IM zyprexa 10mg given in her left deltoid. Pt then allowed to pace in and out of room. Pt still crying to self and being loud. Pt would not allow the use of the interpretor ipad, due to in person interpretor no longer being on the unit and continuously yelled at staff who could not understand her. Everything said to her was explained simply before giving the medication and she said no to it, but nodded yes when she asked about understanding the need for the medication.

## 2022-09-29 PROCEDURE — 1240000000 HC EMOTIONAL WELLNESS R&B

## 2022-09-29 ASSESSMENT — PAIN SCALES - GENERAL
PAINLEVEL_OUTOF10: 0

## 2022-09-29 ASSESSMENT — SLEEP AND FATIGUE QUESTIONNAIRES
SLEEP PATTERN: UTA
DO YOU HAVE DIFFICULTY SLEEPING: YES
DO YOU USE A SLEEP AID: NO

## 2022-09-29 ASSESSMENT — PAIN SCALES - WONG BAKER
WONGBAKER_NUMERICALRESPONSE: 0

## 2022-09-29 NOTE — BH NOTE
Pt asked through the  if she would take her nighttime pills and she stated \"no, I'm not going to take anything. \"

## 2022-09-29 NOTE — PLAN OF CARE
Pt remains isolative to self. Pricila Razo has been approached several times and offered any food or fluids she wants, but continues to decline. She started slamming her door and would not stopped when asked. She was educated about the pts here being startled due to the loud banging noise. She continued to slam the door. The  assisted when Marcial Perry was talking to her. She was educated that that if her agitation continued she would need medicated. She stated she did not want to take pills and an injection was offered. She did not want an IM. She stopped slamming the door and sat on her bed. She had no explanation as to why she wanted to slam doors/disrupt the unit. She continues to sit on her bed. Problem: Pain  Goal: Verbalizes/displays adequate comfort level or baseline comfort level  9/28/2022 2255 by Christopher Faustin RN  Outcome: Not Progressing  9/28/2022 1750 by Christopher Faustin RN  Outcome: Progressing     Problem: Safety - Adult  Goal: Free from fall injury  9/28/2022 2255 by Christopher Faustin RN  Outcome: Not Progressing  9/28/2022 1750 by Christopher Faustin RN  Outcome: Progressing     Problem: Psychosis  Goal: Will report no hallucinations or delusions  Description: INTERVENTIONS:  1. Administer medication as  ordered  2. Assist with reality testing to support increasing orientation  3. Assess if patient's hallucinations or delusions are encouraging self harm or harm to others and intervene as appropriate  9/28/2022 2255 by Christopher Faustin RN  Outcome: Not Progressing  9/28/2022 1750 by Christopher Faustin RN  Outcome: Not Progressing     Problem: Sleep Disturbance  Goal: Will exhibit normal sleeping pattern  Description: INTERVENTIONS:  1. Administer medication as ordered  2. Decrease environmental stimuli, including noise, as appropriate  3.  Discourage social isolation and naps during the day  9/28/2022 2255 by Christopher Faustin RN  Outcome: Not Progressing  9/28/2022 1750 by Christopher Faustin RN  Outcome: Not Progressing

## 2022-09-29 NOTE — PROGRESS NOTES
Group Therapy Note    Date: 9/29/2022  Start Time: 1300  End Time:  1400  Number of Participants: 7    Type of Group: Music Group    Notes:  Pt present for Music Group    Participation Level:  Active Listener and Interactive    Participation Quality: Appropriate and Attentive      Speech:  normal      Affective Functioning: Congruent      Endings: None Reported    Modes of Intervention: Support, Socialization, Activity, and Media      Discipline Responsible: Chaplain Manolo León       09/29/22 1527   Encounter Summary   Encounter Overview/Reason  Behavioral Health   Service Provided For: Patient   Last Encounter    (9/29 Music Group)   Complexity of Encounter Moderate   Begin Time 1300   End Time  1345   Total Time Calculated 45 min   Behavioral Health    Type  Spirituality Group

## 2022-09-29 NOTE — PROGRESS NOTES
Department of Psychiatry  AttendingProgress Note  Chief Complaint: psychosis  Probated to [facility] today . She did not attend the hearing. Today I met with her and the  and she appeared more animated and willing to engage. Although she responded with minimal words she did answer with more than \"No\" today . She stated her 's name and her 2 kid's names. Attempted to call  but tne stopped. She asked for a tray of food and then declined. Appears less disheveled. Refusing meds  Patient's chart was reviewed and collaborated with  about the treatment plan. SUBJECTIVE:    Patient is feeling  DEVIN . Suicidal ideation:  DEVIN. Patient  Devin  medication side effects. ROS: Patient has new complaints: no  Sleeping adequately:  Yes   Appetite adequate: No:   Attending groups: No:   Visitors:No    OBJECTIVE    Physical  VITALS:  BP (!) 125/102 Comment: Pt moving and yelling at spouse  Pulse 99   Temp 98.1 °F (36.7 °C) (Oral)   Resp 18   LMP  (LMP Unknown)     Mental Status Examination:  Patients appearance was ill-appearing. Thoughts are Paucity of Ideas. Homicidal ideations none. No abnormal movements, tics or mannerisms. Memory DEVIN Aims 0. Concentration Unable to Assess. Alert and oriented X 4. Insight and Judgement impaired insight. Patient was uncooperative.  Patient gait normal. Mood constricted, affect flat affect Hallucinations DEVIN, suicidal ideations no specific plan to harm self Speech increased latency of response  Data  Labs:   Admission on 09/23/2022   Component Date Value Ref Range Status    POC Glucose 09/26/2022 80  70 - 99 mg/dl Final    Performed on 09/26/2022 ACCU-CHEK   Final    WBC 09/27/2022 7.8  4.0 - 11.0 K/uL Final    RBC 09/27/2022 4.55  4.00 - 5.20 M/uL Final    Hemoglobin 09/27/2022 13.3  12.0 - 16.0 g/dL Final    Hematocrit 09/27/2022 40.0  36.0 - 48.0 % Final    MCV 09/27/2022 87.9  80.0 - 100.0 fL Final    MCH 09/27/2022 29.3  26.0 - 34.0 pg Final    MCHC 09/27/2022 33.3  31.0 - 36.0 g/dL Final    RDW 09/27/2022 13.1  12.4 - 15.4 % Final    Platelets 38/59/8167 315  135 - 450 K/uL Final    MPV 09/27/2022 7.7  5.0 - 10.5 fL Final    Neutrophils % 09/27/2022 73.1  % Final    Lymphocytes % 09/27/2022 17.4  % Final    Monocytes % 09/27/2022 6.2  % Final    Eosinophils % 09/27/2022 0.6  % Final    Basophils % 09/27/2022 2.7  % Final    Neutrophils Absolute 09/27/2022 5.7  1.7 - 7.7 K/uL Final    Lymphocytes Absolute 09/27/2022 1.4  1.0 - 5.1 K/uL Final    Monocytes Absolute 09/27/2022 0.5  0.0 - 1.3 K/uL Final    Eosinophils Absolute 09/27/2022 0.0  0.0 - 0.6 K/uL Final    Basophils Absolute 09/27/2022 0.2  0.0 - 0.2 K/uL Final    Sodium 09/27/2022 140  136 - 145 mmol/L Final    Potassium reflex Magnesium 09/27/2022 4.1  3.5 - 5.1 mmol/L Final    Chloride 09/27/2022 105  99 - 110 mmol/L Final    CO2 09/27/2022 17 (A)  21 - 32 mmol/L Final    Anion Gap 09/27/2022 18 (A)  3 - 16 Final    Glucose 09/27/2022 95  70 - 99 mg/dL Final    BUN 09/27/2022 13  7 - 20 mg/dL Final    Creatinine 09/27/2022 <0.5 (A)  0.6 - 1.1 mg/dL Final    GFR Non- 09/27/2022 >60  >60 Final    Comment: >60 mL/min/1.73m2 EGFR, calc. for ages 25 and older using the  MDRD formula (not corrected for weight), is valid for stable  renal function. GFR  09/27/2022 >60  >60 Final    Comment: Chronic Kidney Disease: less than 60 ml/min/1.73 sq.m. Kidney Failure: less than 15 ml/min/1.73 sq.m. Results valid for patients 18 years and older.       Calcium 09/27/2022 9.6  8.3 - 10.6 mg/dL Final            Medications  Current Facility-Administered Medications: OLANZapine (ZYPREXA) tablet 10 mg, 10 mg, Oral, Q4H PRN **OR** OLANZapine (ZYPREXA) 10 mg in sterile water 2 mL injection, 10 mg, IntraMUSCular, Q4H PRN  diazePAM (VALIUM) injection 10 mg, 10 mg, IntraMUSCular, Q4H PRN  QUEtiapine (SEROQUEL XR) extended release tablet 200 mg, 200 mg, Oral, Nightly  magnesium hydroxide (MILK OF MAGNESIA) 400 MG/5ML suspension 30 mL, 30 mL, Oral, Daily PRN  nicotine polacrilex (COMMIT) lozenge 2 mg, 2 mg, Oral, Q1H PRN  aluminum & magnesium hydroxide-simethicone (MAALOX) 200-200-20 MG/5ML suspension 30 mL, 30 mL, Oral, Q6H PRN  diphenhydrAMINE (BENADRYL) injection 50 mg, 50 mg, IntraMUSCular, Q4H PRN  acetaminophen (TYLENOL) tablet 650 mg, 650 mg, Oral, Q4H PRN  LORazepam (ATIVAN) tablet 2 mg, 2 mg, Oral, BID    ASSESSMENT AND PLAN    Principal Problem:    Bipolar affective disorder, current episode manic with psychotic symptoms (HCC)  Active Problems:    Hypokalemia  Resolved Problems:    * No resolved hospital problems. *       1. Patient s symptoms   are improving  2. Probable discharge is next week  3. Discharge planning is incomplete  4. Suicidal ideation is  DEVIN  5. Total time with patient was 40 minutes and more than 50 % of that time was spent counseling the patient on their symptoms, treatment and expected goals.

## 2022-09-29 NOTE — PLAN OF CARE
Problem: Pain  Goal: Verbalizes/displays adequate comfort level or baseline comfort level  9/28/2022 2255 by Manuel Jara RN  Outcome: Not Progressing     Problem: Safety - Adult  Goal: Free from fall injury  9/29/2022 1145 by Obi Mathias RN  Outcome: Progressing  9/28/2022 2255 by Manuel Jara RN  Outcome: Not Progressing     Problem: Psychosis  Goal: Will report no hallucinations or delusions  Description: INTERVENTIONS:  1. Administer medication as  ordered  2. Assist with reality testing to support increasing orientation  3. Assess if patient's hallucinations or delusions are encouraging self harm or harm to others and intervene as appropriate  9/29/2022 1145 by Obi Mathias RN  Outcome: Not Progressing  9/28/2022 2255 by Manuel Jara RN  Outcome: Not Progressing     Problem: Sleep Disturbance  Goal: Will exhibit normal sleeping pattern  Description: INTERVENTIONS:  1. Administer medication as ordered  2. Decrease environmental stimuli, including noise, as appropriate  3.  Discourage social isolation and naps during the day  9/28/2022 2255 by Manuel Jara RN  Outcome: Not Progressing

## 2022-09-29 NOTE — PROGRESS NOTES
has been on the unit this morning. Pt voiced she was hungry but declined tray and packaged snacks despite encouragement. Pt stated she wanted to call her  and was shown how to operate the phone, she then became paranoid and declined to make her call. Pt stated she wanted medications, but declined to take her scheduled ativan. She has been at the desk frequently this morning. Stated she is angry that she does not have her phone and that she cannot leave. Pt reassured that she is in a safe environment and educated on phone use. She is resting in her room at this time. Will continue to monitor for safety and comfort.

## 2022-09-30 LAB
GLUCOSE BLD-MCNC: 124 MG/DL (ref 70–99)
PERFORMED ON: ABNORMAL

## 2022-09-30 PROCEDURE — 1240000000 HC EMOTIONAL WELLNESS R&B

## 2022-09-30 ASSESSMENT — PAIN SCALES - WONG BAKER: WONGBAKER_NUMERICALRESPONSE: 0

## 2022-09-30 ASSESSMENT — PAIN SCALES - GENERAL: PAINLEVEL_OUTOF10: 0

## 2022-09-30 NOTE — PROGRESS NOTES
Behavioral Services                                              Medicare Re-Certification    I certify that the inpatient psychiatric hospital services furnished since the previous certification/re-certification were, and continue to be, medically necessary for;    [x] (1) Treatment which could reasonably be expected to improve the patient's condition,    [x] (2) Or for diagnostic study. Estimated length of stay/service 5 d    Plan for post-hospital care outpt    This patient continues to need, on a daily basis, active treatment furnished directly by or requiring the supervision of inpatient psychiatric personnel.     Electronically signed by Sammi Blackburn MD on 9/30/2022 at 3:03 PM

## 2022-09-30 NOTE — PROGRESS NOTES
Department of Psychiatry  AttendingProgress Note  Chief Complaint: psychosis  Naima Dockery was on unit more often today . She was working well with the male . She was more animated and had been observed to be drinking and attempted to eat but may be paranoid to eat the food. She would not eat food brought by  either. She had changed  her shirt again  but no bathing and is becoming malodorous. Will hopefully see more compliance with meds as she improves. Labs from 9/27 appears stable    Patient's chart was reviewed and collaborated with  about the treatment plan. SUBJECTIVE:    Patient is feeling unchanged. Suicidal ideation:  denies suicidal ideation. Patient  DEVIN  medication side effects. ROS: Patient has new complaints: no  Sleeping adequately:  Yes   Appetite adequate: No:   Attending groups: No:   Visitors:No    OBJECTIVE    Physical  VITALS:  /78   Pulse 86   Temp 98.2 °F (36.8 °C) (Infrared)   Resp 18   LMP  (LMP Unknown)     Mental Status Examination:  Patients appearance was ill-appearing. Thoughts are Paucity of Ideas. Homicidal ideations none. No abnormal movements, tics or mannerisms. Memory DEVIN Aims 0. Concentration Unable to Assess. Alert and oriented X 4. Insight and Judgement impaired insight. Patient was uncooperative.  Patient gait normal. Mood constricted, affect flat affect Hallucinations DEVIN, suicidal ideations no specific plan to harm self Speech soft  Data  Labs:   Admission on 09/23/2022   Component Date Value Ref Range Status    POC Glucose 09/26/2022 80  70 - 99 mg/dl Final    Performed on 09/26/2022 ACCU-CHEK   Final    WBC 09/27/2022 7.8  4.0 - 11.0 K/uL Final    RBC 09/27/2022 4.55  4.00 - 5.20 M/uL Final    Hemoglobin 09/27/2022 13.3  12.0 - 16.0 g/dL Final    Hematocrit 09/27/2022 40.0  36.0 - 48.0 % Final    MCV 09/27/2022 87.9  80.0 - 100.0 fL Final    MCH 09/27/2022 29.3  26.0 - 34.0 pg Final    MCHC 09/27/2022 33.3  31.0 - 36.0 g/dL Final    RDW 09/27/2022 13.1  12.4 - 15.4 % Final    Platelets 16/75/2129 315  135 - 450 K/uL Final    MPV 09/27/2022 7.7  5.0 - 10.5 fL Final    Neutrophils % 09/27/2022 73.1  % Final    Lymphocytes % 09/27/2022 17.4  % Final    Monocytes % 09/27/2022 6.2  % Final    Eosinophils % 09/27/2022 0.6  % Final    Basophils % 09/27/2022 2.7  % Final    Neutrophils Absolute 09/27/2022 5.7  1.7 - 7.7 K/uL Final    Lymphocytes Absolute 09/27/2022 1.4  1.0 - 5.1 K/uL Final    Monocytes Absolute 09/27/2022 0.5  0.0 - 1.3 K/uL Final    Eosinophils Absolute 09/27/2022 0.0  0.0 - 0.6 K/uL Final    Basophils Absolute 09/27/2022 0.2  0.0 - 0.2 K/uL Final    Sodium 09/27/2022 140  136 - 145 mmol/L Final    Potassium reflex Magnesium 09/27/2022 4.1  3.5 - 5.1 mmol/L Final    Chloride 09/27/2022 105  99 - 110 mmol/L Final    CO2 09/27/2022 17 (A)  21 - 32 mmol/L Final    Anion Gap 09/27/2022 18 (A)  3 - 16 Final    Glucose 09/27/2022 95  70 - 99 mg/dL Final    BUN 09/27/2022 13  7 - 20 mg/dL Final    Creatinine 09/27/2022 <0.5 (A)  0.6 - 1.1 mg/dL Final    GFR Non- 09/27/2022 >60  >60 Final    Comment: >60 mL/min/1.73m2 EGFR, calc. for ages 25 and older using the  MDRD formula (not corrected for weight), is valid for stable  renal function. GFR  09/27/2022 >60  >60 Final    Comment: Chronic Kidney Disease: less than 60 ml/min/1.73 sq.m. Kidney Failure: less than 15 ml/min/1.73 sq.m. Results valid for patients 18 years and older.       Calcium 09/27/2022 9.6  8.3 - 10.6 mg/dL Final    POC Glucose 09/30/2022 124 (A)  70 - 99 mg/dl Final    Performed on 09/30/2022 ACCU-CHEK   Final            Medications  Current Facility-Administered Medications: OLANZapine (ZYPREXA) tablet 10 mg, 10 mg, Oral, Q4H PRN **OR** OLANZapine (ZYPREXA) 10 mg in sterile water 2 mL injection, 10 mg, IntraMUSCular, Q4H PRN  diazePAM (VALIUM) injection 10 mg, 10 mg, IntraMUSCular, Q4H PRN  QUEtiapine (SEROQUEL XR) extended release tablet 200 mg, 200 mg, Oral, Nightly  magnesium hydroxide (MILK OF MAGNESIA) 400 MG/5ML suspension 30 mL, 30 mL, Oral, Daily PRN  nicotine polacrilex (COMMIT) lozenge 2 mg, 2 mg, Oral, Q1H PRN  aluminum & magnesium hydroxide-simethicone (MAALOX) 200-200-20 MG/5ML suspension 30 mL, 30 mL, Oral, Q6H PRN  diphenhydrAMINE (BENADRYL) injection 50 mg, 50 mg, IntraMUSCular, Q4H PRN  acetaminophen (TYLENOL) tablet 650 mg, 650 mg, Oral, Q4H PRN  LORazepam (ATIVAN) tablet 2 mg, 2 mg, Oral, BID    ASSESSMENT AND PLAN    Principal Problem:    Bipolar affective disorder, current episode manic with psychotic symptoms (HCC)  Active Problems:    Hypokalemia  Resolved Problems:    * No resolved hospital problems. *       1. Patient s symptoms   are improving  2. Probable discharge is next week  3. Discharge planning is incomplete  4. Suicidal ideation is  none  5. Total time with patient was 40 minutes and more than 50 % of that time was spent counseling the patient on their symptoms, treatment and expected goals.

## 2022-09-30 NOTE — PLAN OF CARE
Problem: Pain  Goal: Verbalizes/displays adequate comfort level or baseline comfort level  Outcome: Progressing     Problem: Safety - Adult  Goal: Free from fall injury  Outcome: Progressing     Problem: Psychosis  Goal: Will report no hallucinations or delusions  Description: INTERVENTIONS:  1. Administer medication as  ordered  2. Assist with reality testing to support increasing orientation  3.  Assess if patient's hallucinations or delusions are encouraging self harm or harm to others and intervene as appropriate  Outcome: Progressing

## 2022-09-30 NOTE — PROGRESS NOTES
Pt up ad tony. Began day appearing very worried and scared of staff. The interpretor was able to locate her mothers number and she was able to make a call to [facility] using his phone with international calling. Pt's mood and behaviors completely changed for the better. She ate 100% of her dinner. She attended and participated in group. She interacted and was social with peers. Pt has been smiling and brightened since calling her mom. She has been at the desk, smiling and commenting pleasantly through the day. Monitored for safety and comfort.

## 2022-09-30 NOTE — PROGRESS NOTES
Comprehensive Nutrition Assessment    Type and Reason for Visit:  Initial (LOS x 7 days)    Nutrition Recommendations/Plan:   Continue ADULT DIET; Regular diet order - please document meal intake % or refusal of meals in flow sheets for each meal.   Added Ensure high-protein with meals - please document ONS intake % or refusal of ONS in flow sheets. Monitor appetite, meal intake, and acceptance/intake of ONS. Please obtain a height and an actual, current weight for this patient - no height or weight was obtained upon admission. Monitor mental status, bowel function, and weight trends. Malnutrition Assessment:  Malnutrition Status: At risk for malnutrition (09/30/22 1433)    Context:  Acute Illness     Findings of the 6 clinical characteristics of malnutrition:  Energy Intake:  75% or less of estimated energy requirements for 7 or more days  Weight Loss:  Unable to assess     Body Fat Loss:  Unable to assess     Muscle Mass Loss:  Unable to assess    Fluid Accumulation:  No significant fluid accumulation     Strength:  Not Performed    Nutrition Assessment:    patient is nutritionally compromised AEB poor appetite/po intake r/t mental health compromise PTA and she is at risk for further compromise d/t ongoing mental health compromise and patient has been declining food/fluids on I unit; will continue ADULT DIET;  Regular diet order and will add Ensure high-protein with meals    Nutrition Related Findings:    patient is A & O to person at this time; patient was probated to [facility] today; per psych notes, patient appeared more animated and willing to engage on 9/29/22; she can ambulate on the unit; she has a  and 2 children at home; patient will ask for food but then decline + refuses meals Wound Type: None       Current Nutrition Intake & Therapies:    Average Meal Intake: Unable to assess (no po intake data documented in flow sheets)  Average Supplements Intake: None Ordered  ADULT DIET; Regular  ADULT ORAL NUTRITION SUPPLEMENT; Breakfast, Lunch, Dinner;  Low Calorie/High Protein Oral Supplement    Anthropometric Measures:  Height:  (no height on file)  Ideal Body Weight (IBW): unable to calculate d/t lack of data  Admission Body Weight:  (no weight obtained)  Current Body Weight:  (no weight on file)  Current BMI (kg/m2): unable to calculate d/t lack of data   Usual Body Weight:  (no weight hx in EMR)     Weight Adjustment For: No Adjustment                 BMI Categories:  (BMI cannot be calculated without height/weight)    Estimated Daily Nutrient Needs:        Energy (kcal/day): unable to calculate     Protein (g/day): unable to calculate     Fluid (ml/day): unable to calculate    Nutrition Diagnosis:   Inadequate oral intake related to inadequate protein-energy intake, psychological cause or life stress as evidenced by poor intake prior to admission, other (comment) (mental health compromise)    Nutrition Interventions:   Food and/or Nutrient Delivery: Continue Current Diet, Start Oral Nutrition Supplement  Nutrition Education/Counseling: No recommendation at this time  Coordination of Nutrition Care: Continue to monitor while inpatient, Coordination of Care       Goals:     Goals: PO intake 50% or greater, by next RD assessment       Nutrition Monitoring and Evaluation:   Behavioral-Environmental Outcomes: None Identified  Food/Nutrient Intake Outcomes: Food and Nutrient Intake, Supplement Intake  Physical Signs/Symptoms Outcomes: Meal Time Behavior, Skin, Weight    Discharge Planning:    Continue current diet     Hari Puente, [address] [facility] [address],   Contact: [phone]

## 2022-10-01 PROCEDURE — 2580000003 HC RX 258: Performed by: PSYCHIATRY & NEUROLOGY

## 2022-10-01 PROCEDURE — 1240000000 HC EMOTIONAL WELLNESS R&B

## 2022-10-01 PROCEDURE — 6360000002 HC RX W HCPCS: Performed by: PSYCHIATRY & NEUROLOGY

## 2022-10-01 PROCEDURE — 2500000003 HC RX 250 WO HCPCS: Performed by: PSYCHIATRY & NEUROLOGY

## 2022-10-01 PROCEDURE — 6370000000 HC RX 637 (ALT 250 FOR IP): Performed by: PSYCHIATRY & NEUROLOGY

## 2022-10-01 RX ORDER — OLANZAPINE 10 MG/1
10 TABLET ORAL NIGHTLY
Status: DISCONTINUED | OUTPATIENT
Start: 2022-10-01 | End: 2022-10-04

## 2022-10-01 RX ADMIN — LORAZEPAM 2 MG: 2 TABLET ORAL at 22:25

## 2022-10-01 RX ADMIN — DIPHENHYDRAMINE HYDROCHLORIDE 50 MG: 50 INJECTION INTRAMUSCULAR; INTRAVENOUS at 20:10

## 2022-10-01 RX ADMIN — OLANZAPINE 10 MG: 10 INJECTION, POWDER, LYOPHILIZED, FOR SOLUTION INTRAMUSCULAR at 20:05

## 2022-10-01 RX ADMIN — DIAZEPAM 10 MG: 5 INJECTION, SOLUTION INTRAMUSCULAR; INTRAVENOUS at 20:10

## 2022-10-01 NOTE — PLAN OF CARE
Problem: Psychosis  Goal: Will report no hallucinations or delusions  Description: INTERVENTIONS:  1. Administer medication as  ordered  2. Assist with reality testing to support increasing orientation  3. Assess if patient's hallucinations or delusions are encouraging self harm or harm to others and intervene as appropriate  10/1/2022 1613 by Sagrario Roche RN  Outcome: Progressing  10/1/2022 0904 by Ramona Zafar RN  Outcome: Not Progressing     Problem: Psychosis  Goal: Will report no hallucinations or delusions  Description: INTERVENTIONS:  1. Administer medication as  ordered  2. Assist with reality testing to support increasing orientation  3. Assess if patient's hallucinations or delusions are encouraging self harm or harm to others and intervene as appropriate  10/1/2022 1613 by Sagrario Roche RN  Outcome: Progressing  10/1/2022 0904 by Ramona Zafar RN  Outcome: Not Progressing     Problem: Sleep Disturbance  Goal: Will exhibit normal sleeping pattern  Description: INTERVENTIONS:  1. Administer medication as ordered  2. Decrease environmental stimuli, including noise, as appropriate  3. Discourage social isolation and naps during the day  10/1/2022 1613 by Sagrario Roche RN  Outcome: Progressing  10/1/2022 0904 by Ramona Zafar RN  Outcome: Not Progressing     Pt has been isolative to her room today. Appetite remains poor. Patient's  and brother came to the facility to visit with patient and she refused to allow them onto the unit.  unable to get patient to interact much this shift.

## 2022-10-01 NOTE — PLAN OF CARE
Problem: Nutrition Deficit:  Goal: Optimize nutritional status  9/28/2022 2257 by Can Finley RN  Outcome:  not Progressing   9/28/2022 Laura Lovelace RN  Problem: Psychosis  Goal: Will report no hallucinations or delusions  Description: INTERVENTIONS:  1. Administer medication as  ordered  2. Assist with reality testing to support increasing orientation  3. Assess if patient's hallucinations or delusions are encouraging self harm or harm to others and intervene as appropriate  9/28/2022 0904 by Marijean Mohs, RN  Outcome: Not Progressing  9/28/2022 2257 by Laura Lovelace RN  Outcome: Not Progressing       Problem: Sleep Disturbance  Goal: Will exhibit normal sleeping pattern  Description: INTERVENTIONS:  1. Administer medication as ordered  2. Decrease environmental stimuli, including noise, as appropriate  3. Discourage social isolation and naps during the day  19/28/2022 0904 by Marijean Mohs, RN  Outcome: Not Progressing  9/30/2022 2257 by Can Finley RN  Outcome: Not Progressing   Pt was doing slightly better tonight. She drank only water but did want her own pitcher. She seemed afraid to take it to her room, so left it on TS counter. Pt appears to RTIS. She is paranoid, guarded. Will not consistently answer questions even with the . Pt slept about 3 hours tonight. Continues to refuse meds. Refuses VS.Poor food intake. Pt is free from falls and gait is steady.

## 2022-10-01 NOTE — PROGRESS NOTES
Department of Psychiatry  AttendingProgress Note  Chief Complaint: psychosis  Came out of room with her head covered by her clothing. She is not interacting as much as yesterday. Did eat and drank more than other days. Reported to staff that she sees people coming through her window to collect dead people. More isolative today . Will stop Seroquel and attempt to give her Zyprexa 10 mg HS for psychosis. Patient's chart was reviewed and collaborated with  about the treatment plan. SUBJECTIVE:    Patient is feeling  DEVIN . Suicidal ideation:  DEVIN. Patient does not have medication side effects. ROS: Patient has new complaints: no  Sleeping adequately:  Yes   Appetite adequate: No:   Attending groups: No:   Visitors:No    OBJECTIVE    Physical  VITALS:  /70   Pulse 76   Temp 97.4 °F (36.3 °C) (Oral)   Resp 18   Ht 5' 4\" (1.626 m)   Wt 179 lb (81.2 kg)   LMP  (LMP Unknown)   SpO2 98%   BMI 30.73 kg/m²     Mental Status Examination:  Patients appearance was ill-appearing. Thoughts are Illogical. Homicidal ideations none. No abnormal movements, tics or mannerisms. Memory DEVIN Aims 0. Concentration Unable to Assess. Alert and oriented X 4. Insight and Judgement paranoid ideations. Patient was uncooperative.  Patient gait normal. Mood constricted, affect flat affect Hallucinations DEVIN, suicidal ideations no specific plan to harm self Speech delayed and increased latency of response  Data  Labs:   Admission on 09/23/2022   Component Date Value Ref Range Status    POC Glucose 09/26/2022 80  70 - 99 mg/dl Final    Performed on 09/26/2022 ACCU-CHEK   Final    WBC 09/27/2022 7.8  4.0 - 11.0 K/uL Final    RBC 09/27/2022 4.55  4.00 - 5.20 M/uL Final    Hemoglobin 09/27/2022 13.3  12.0 - 16.0 g/dL Final    Hematocrit 09/27/2022 40.0  36.0 - 48.0 % Final    MCV 09/27/2022 87.9  80.0 - 100.0 fL Final    MCH 09/27/2022 29.3  26.0 - 34.0 pg Final    MCHC 09/27/2022 33.3  31.0 - 36.0 g/dL Final RDW 09/27/2022 13.1  12.4 - 15.4 % Final    Platelets 69/76/1571 315  135 - 450 K/uL Final    MPV 09/27/2022 7.7  5.0 - 10.5 fL Final    Neutrophils % 09/27/2022 73.1  % Final    Lymphocytes % 09/27/2022 17.4  % Final    Monocytes % 09/27/2022 6.2  % Final    Eosinophils % 09/27/2022 0.6  % Final    Basophils % 09/27/2022 2.7  % Final    Neutrophils Absolute 09/27/2022 5.7  1.7 - 7.7 K/uL Final    Lymphocytes Absolute 09/27/2022 1.4  1.0 - 5.1 K/uL Final    Monocytes Absolute 09/27/2022 0.5  0.0 - 1.3 K/uL Final    Eosinophils Absolute 09/27/2022 0.0  0.0 - 0.6 K/uL Final    Basophils Absolute 09/27/2022 0.2  0.0 - 0.2 K/uL Final    Sodium 09/27/2022 140  136 - 145 mmol/L Final    Potassium reflex Magnesium 09/27/2022 4.1  3.5 - 5.1 mmol/L Final    Chloride 09/27/2022 105  99 - 110 mmol/L Final    CO2 09/27/2022 17 (A)  21 - 32 mmol/L Final    Anion Gap 09/27/2022 18 (A)  3 - 16 Final    Glucose 09/27/2022 95  70 - 99 mg/dL Final    BUN 09/27/2022 13  7 - 20 mg/dL Final    Creatinine 09/27/2022 <0.5 (A)  0.6 - 1.1 mg/dL Final    GFR Non- 09/27/2022 >60  >60 Final    Comment: >60 mL/min/1.73m2 EGFR, calc. for ages 25 and older using the  MDRD formula (not corrected for weight), is valid for stable  renal function. GFR  09/27/2022 >60  >60 Final    Comment: Chronic Kidney Disease: less than 60 ml/min/1.73 sq.m. Kidney Failure: less than 15 ml/min/1.73 sq.m. Results valid for patients 18 years and older.       Calcium 09/27/2022 9.6  8.3 - 10.6 mg/dL Final    POC Glucose 09/30/2022 124 (A)  70 - 99 mg/dl Final    Performed on 09/30/2022 ACCU-CHEK   Final            Medications  Current Facility-Administered Medications: OLANZapine (ZYPREXA) tablet 10 mg, 10 mg, Oral, Q4H PRN **OR** OLANZapine (ZYPREXA) 10 mg in sterile water 2 mL injection, 10 mg, IntraMUSCular, Q4H PRN  diazePAM (VALIUM) injection 10 mg, 10 mg, IntraMUSCular, Q4H PRN  QUEtiapine (SEROQUEL XR) extended release tablet 200 mg, 200 mg, Oral, Nightly  magnesium hydroxide (MILK OF MAGNESIA) 400 MG/5ML suspension 30 mL, 30 mL, Oral, Daily PRN  nicotine polacrilex (COMMIT) lozenge 2 mg, 2 mg, Oral, Q1H PRN  aluminum & magnesium hydroxide-simethicone (MAALOX) 200-200-20 MG/5ML suspension 30 mL, 30 mL, Oral, Q6H PRN  diphenhydrAMINE (BENADRYL) injection 50 mg, 50 mg, IntraMUSCular, Q4H PRN  acetaminophen (TYLENOL) tablet 650 mg, 650 mg, Oral, Q4H PRN  LORazepam (ATIVAN) tablet 2 mg, 2 mg, Oral, BID    ASSESSMENT AND PLAN    Principal Problem:    Bipolar affective disorder, current episode manic with psychotic symptoms (HCC)  Active Problems:    Hypokalemia  Resolved Problems:    * No resolved hospital problems. *       1. Patient s symptoms   show no change  2. Probable discharge is next week  3. Discharge planning is incomplete  4. Suicidal ideation is  DEVIN  5. Total time with patient was 40 minutes and more than 50 % of that time was spent counseling the patient on their symptoms, treatment and expected goals.

## 2022-10-01 NOTE — PLAN OF CARE
Problem: Psychosis  Goal: Will report no hallucinations or delusions  Description: INTERVENTIONS:  1. Administer medication as  ordered  2. Assist with reality testing to support increasing orientation  3. Assess if patient's hallucinations or delusions are encouraging self harm or harm to others and intervene as appropriate  9/30/2022 2257 by Can Finley RN  Outcome: Not Progressing     Problem: Sleep Disturbance  Goal: Will exhibit normal sleeping pattern  Description: INTERVENTIONS:  1. Administer medication as ordered  2. Decrease environmental stimuli, including noise, as appropriate  3. Discourage social isolation and naps during the day  Outcome: Not Progressing     Problem: Nutrition Deficit:  Goal: Optimize nutritional status  Outcome: Progressing     Pt ate/drank more during day/night shift. Pt continues to have continuous VH and paranoia. Pt reports she sees people coming through her windows to collect dead people and children/spiders/puppies on unit. Pt spending majority of shift in her bathroom in the dark with the door closed to hide. Pt not sleeping adequately. Staff having multiple attempts redirecting pt and trying to assist pt to bed but pt uncooperative.

## 2022-10-02 PROCEDURE — 1240000000 HC EMOTIONAL WELLNESS R&B

## 2022-10-02 PROCEDURE — 6360000002 HC RX W HCPCS: Performed by: PSYCHIATRY & NEUROLOGY

## 2022-10-02 RX ORDER — DIAZEPAM 5 MG/ML
5 INJECTION, SOLUTION INTRAMUSCULAR; INTRAVENOUS EVERY 6 HOURS PRN
Status: DISCONTINUED | OUTPATIENT
Start: 2022-10-02 | End: 2022-10-14 | Stop reason: HOSPADM

## 2022-10-02 RX ORDER — HALOPERIDOL 5 MG/ML
10 INJECTION INTRAMUSCULAR EVERY 8 HOURS PRN
Status: DISCONTINUED | OUTPATIENT
Start: 2022-10-02 | End: 2022-10-02

## 2022-10-02 RX ORDER — DIPHENHYDRAMINE HYDROCHLORIDE 50 MG/ML
50 INJECTION INTRAMUSCULAR; INTRAVENOUS EVERY 6 HOURS PRN
Status: DISCONTINUED | OUTPATIENT
Start: 2022-10-02 | End: 2022-10-14 | Stop reason: HOSPADM

## 2022-10-02 RX ORDER — HALOPERIDOL 5 MG/ML
10 INJECTION INTRAMUSCULAR EVERY 6 HOURS PRN
Status: DISCONTINUED | OUTPATIENT
Start: 2022-10-02 | End: 2022-10-14 | Stop reason: HOSPADM

## 2022-10-02 RX ADMIN — DIAZEPAM 10 MG: 5 INJECTION, SOLUTION INTRAMUSCULAR; INTRAVENOUS at 08:26

## 2022-10-02 RX ADMIN — DIAZEPAM 5 MG: 5 INJECTION, SOLUTION INTRAMUSCULAR; INTRAVENOUS at 14:51

## 2022-10-02 RX ADMIN — HALOPERIDOL LACTATE 10 MG: 5 INJECTION, SOLUTION INTRAMUSCULAR at 08:24

## 2022-10-02 RX ADMIN — HALOPERIDOL LACTATE 10 MG: 5 INJECTION, SOLUTION INTRAMUSCULAR at 14:51

## 2022-10-02 RX ADMIN — DIPHENHYDRAMINE HYDROCHLORIDE 50 MG: 50 INJECTION INTRAMUSCULAR; INTRAVENOUS at 08:25

## 2022-10-02 RX ADMIN — DIPHENHYDRAMINE HYDROCHLORIDE 50 MG: 50 INJECTION, SOLUTION INTRAMUSCULAR; INTRAVENOUS at 14:51

## 2022-10-02 NOTE — PROGRESS NOTES
Sandra was slamming doors at the beginning of the shift. She began yelling at, kicking, and hitting staff. Zyprexa 10mg IM administered, but not effective. Security called; code violet called. Valium 10mg IM & Benadryl 50mg IM administered. Restraints applied & d/c after 30 minutes. Sandra slept for 1 hour & awoke calm & cooperative. During this time, she took her scheduled Ativan, cleaned herself and changed her clothes, and allowed her vitals to be obtained. Sandra was awake for an hour and went back to sleep for remainder of shift.

## 2022-10-02 NOTE — BH NOTE
Patient is up and wandering the unit, splashing water from a water bottle on staff and computers at the nurses station. Walked into another patients' room and splashed water on them. Staff attempted to redirect several times, patients behavior continued to escalate AEB yelling, throwing more water on staff and the nurses station, standing outside her door and slamming it shut. Medicated with IM medications per order.

## 2022-10-02 NOTE — PLAN OF CARE
Problem: Safety - Violent/Self-destructive Restraint  Goal: Remains free of injury from restraints (Restraint for Violent/Self-Destructive Behavior)  Description: INTERVENTIONS:  1. Determine that de-escalation and other, less restrictive measures have been tried or would not be effective before applying the restraint  2. Identify and document the criteria for restraint  3. Evaluate the patient's condition at the time of restraint application  4. Inform patient/family regarding the reason for restraint/seclusion  5. Q2H: Monitor comfort, nutrition and hydration needs  6. Q15M: Perform safety checks including skin, circulation, sensory, respiratory and psychological status  7. Ensure continuous observation  8.  Identify and implement measures to help patient regain control, assess readiness for release and initiate progressive release per policy  Outcome: Completed   Completed

## 2022-10-02 NOTE — PROGRESS NOTES
Department of Psychiatry  AttendingProgress Note  Chief Complaint: psychosis   Mary Gee had an episode yesterday with aggressive behaviors resulting in a restraint. She was given Zyprexa/valium/Benadryl prn. This am she was aggressive again , slamming doors, yelling and kicking. Code violet called. Received [id number] mg//Valium 10 mg /Benadryl 50 mg IM . Is sleeping in room. Did take her scheduled Ativan po. Calmer currently. Patient's chart was reviewed and collaborated with  about the treatment plan. SUBJECTIVE:    Patient is feeling unchanged. Suicidal ideation:  ANDRE. Patient  ANDRE  medication side effects. ROS: Patient has new complaints: no  Sleeping adequately:  No:    Appetite adequate: No:   Attending groups: No:   Visitors:No    OBJECTIVE    Physical  VITALS:  /88   Pulse (!) 125   Temp 97.4 °F (36.3 °C) (Oral)   Resp 18   Ht 5' 4\" (1.626 m)   Wt 179 lb (81.2 kg)   LMP  (LMP Unknown)   SpO2 100%   BMI 30.73 kg/m²     Mental Status Examination:  Patients appearance was ill-appearing. Thoughts are Illogical. Homicidal ideations none. No abnormal movements, tics or mannerisms. Memory Andre Aims 0. Concentration Unable to Assess. Alert and oriented X 4. Insight and Judgement paranoid ideations. Patient was uncooperative, combative.  Patient gait normal. Mood irritable and labile, affect labile affect Hallucinations Andre, suicidal ideations no specific plan to harm self Speech loud  Data  Labs:   Admission on 09/23/2022   Component Date Value Ref Range Status    POC Glucose 09/26/2022 80  70 - 99 mg/dl Final    Performed on 09/26/2022 ACCU-CHEK   Final    WBC 09/27/2022 7.8  4.0 - 11.0 K/uL Final    RBC 09/27/2022 4.55  4.00 - 5.20 M/uL Final    Hemoglobin 09/27/2022 13.3  12.0 - 16.0 g/dL Final    Hematocrit 09/27/2022 40.0  36.0 - 48.0 % Final    MCV 09/27/2022 87.9  80.0 - 100.0 fL Final    MCH 09/27/2022 29.3  26.0 - 34.0 pg Final    MCHC 09/27/2022 33.3  31.0 - 36.0 g/dL Final    RDW 09/27/2022 13.1  12.4 - 15.4 % Final    Platelets 82/01/0268 315  135 - 450 K/uL Final    MPV 09/27/2022 7.7  5.0 - 10.5 fL Final    Neutrophils % 09/27/2022 73.1  % Final    Lymphocytes % 09/27/2022 17.4  % Final    Monocytes % 09/27/2022 6.2  % Final    Eosinophils % 09/27/2022 0.6  % Final    Basophils % 09/27/2022 2.7  % Final    Neutrophils Absolute 09/27/2022 5.7  1.7 - 7.7 K/uL Final    Lymphocytes Absolute 09/27/2022 1.4  1.0 - 5.1 K/uL Final    Monocytes Absolute 09/27/2022 0.5  0.0 - 1.3 K/uL Final    Eosinophils Absolute 09/27/2022 0.0  0.0 - 0.6 K/uL Final    Basophils Absolute 09/27/2022 0.2  0.0 - 0.2 K/uL Final    Sodium 09/27/2022 140  136 - 145 mmol/L Final    Potassium reflex Magnesium 09/27/2022 4.1  3.5 - 5.1 mmol/L Final    Chloride 09/27/2022 105  99 - 110 mmol/L Final    CO2 09/27/2022 17 (A)  21 - 32 mmol/L Final    Anion Gap 09/27/2022 18 (A)  3 - 16 Final    Glucose 09/27/2022 95  70 - 99 mg/dL Final    BUN 09/27/2022 13  7 - 20 mg/dL Final    Creatinine 09/27/2022 <0.5 (A)  0.6 - 1.1 mg/dL Final    GFR Non- 09/27/2022 >60  >60 Final    Comment: >60 mL/min/1.73m2 EGFR, calc. for ages 25 and older using the  MDRD formula (not corrected for weight), is valid for stable  renal function. GFR  09/27/2022 >60  >60 Final    Comment: Chronic Kidney Disease: less than 60 ml/min/1.73 sq.m. Kidney Failure: less than 15 ml/min/1.73 sq.m. Results valid for patients 18 years and older.       Calcium 09/27/2022 9.6  8.3 - 10.6 mg/dL Final    POC Glucose 09/30/2022 124 (A)  70 - 99 mg/dl Final    Performed on 09/30/2022 ACCU-CHEK   Final            Medications  Current Facility-Administered Medications: haloperidol lactate (HALDOL) injection 10 mg, 10 mg, IntraMUSCular, Q8H PRN  OLANZapine (ZYPREXA) tablet 10 mg, 10 mg, Oral, Nightly  OLANZapine (ZYPREXA) tablet 10 mg, 10 mg, Oral, Q4H PRN **OR** [DISCONTINUED] OLANZapine (ZYPREXA) 10 mg in sterile water 2 mL injection, 10 mg, IntraMUSCular, Q4H PRN  diazePAM (VALIUM) injection 10 mg, 10 mg, IntraMUSCular, Q4H PRN  magnesium hydroxide (MILK OF MAGNESIA) 400 MG/5ML suspension 30 mL, 30 mL, Oral, Daily PRN  nicotine polacrilex (COMMIT) lozenge 2 mg, 2 mg, Oral, Q1H PRN  aluminum & magnesium hydroxide-simethicone (MAALOX) 200-200-20 MG/5ML suspension 30 mL, 30 mL, Oral, Q6H PRN  diphenhydrAMINE (BENADRYL) injection 50 mg, 50 mg, IntraMUSCular, Q4H PRN  acetaminophen (TYLENOL) tablet 650 mg, 650 mg, Oral, Q4H PRN  LORazepam (ATIVAN) tablet 2 mg, 2 mg, Oral, BID    ASSESSMENT AND PLAN    Principal Problem:    Bipolar affective disorder, current episode manic with psychotic symptoms (HCC)  Active Problems:    Hypokalemia  Resolved Problems:    * No resolved hospital problems. *       1. Patient s symptoms   are worsening  2. Probable discharge is next week  3. Discharge planning is incomplete  4. Suicidal ideation is  Andre  5. Total time with patient was 40 minutes and more than 50 % of that time was spent counseling the patient on their symptoms, treatment and expected goals.

## 2022-10-02 NOTE — BH NOTE
Patient continues to be agitated and attempting to slap and kick staff, screaming in [address], unable to redirect. Medicated with Zyprexa IM per order for agitation.

## 2022-10-02 NOTE — PLAN OF CARE
Problem: Pain  Goal: Verbalizes/displays adequate comfort level or baseline comfort level  10/2/2022 0522 by Jordan Cerna RN  Outcome: Progressing     Problem: Safety - Adult  Goal: Free from fall injury  10/2/2022 0522 by Jordan Cerna RN  Outcome: Progressing     Problem: Psychosis  Goal: Will report no hallucinations or delusions  Description: INTERVENTIONS:  1. Administer medication as  ordered  2. Assist with reality testing to support increasing orientation  3. Assess if patient's hallucinations or delusions are encouraging self harm or harm to others and intervene as appropriate  10/2/2022 0522 by Jordan Cerna RN  Outcome: Not Progressing  10/1/2022 1613 by Vinay Harris RN  Outcome: Progressing     Problem: Sleep Disturbance  Goal: Will exhibit normal sleeping pattern  Description: INTERVENTIONS:  1. Administer medication as ordered  2. Decrease environmental stimuli, including noise, as appropriate  3.  Discourage social isolation and naps during the day  10/2/2022 0522 by Jordan Cerna RN  Outcome: Not Progressing  10/1/2022 1613 by Vinay Harris RN  Outcome: Progressing     Problem: Nutrition Deficit:  Goal: Optimize nutritional status  10/2/2022 0522 by Jordan Cerna RN  Outcome: Not Progressing  10/1/2022 1613 by Vinay Harris RN  Outcome: Progressing

## 2022-10-02 NOTE — BH NOTE
Patient resting quietly in bed with eyes closed, unable to arouse with verbal stimulation. Respirations easy and even, no cough or congestion. Restraints removed, pt remained quiet. No longer combative or screaming at staff. Patient assisted to her room, currently she is resting quietly in bed with eyes closed,  no further behavioral outbursts noted.

## 2022-10-02 NOTE — BH NOTE
Restraint/Seclusion Debrief    Patient did not participate in the post restraint debrief. Patient's family  did  participate in the post restraint debrief. If not, why N/A, patient's  was updated on restraints, no questions or concerns voiced from .  verbalized understanding. Discussed the following precipitating factors that led to the restraint:   1. Language Barrier   2. Behaviors noted before patient escalated into combativeness   3. Medications not administered d/t patient refusal  Patient identified that N/A could have been done differently to de-escilate the situation and staff identify that Restraints should have been applied quicker and more timely in order to de-escalate patient quicker. The physical and psychological wellbeing of the patient was assessed and the patient appears to have tolerated the restraints well at this time. Will continue to monitor for any signs of trauma.

## 2022-10-02 NOTE — BH NOTE
Patient is up and wandering the unit, attempting to open other patient's doors, splashing water from a medicine cup onto the staff and the counter and computers at the nurses station. Difficult to redirect but did return to her room after much encouragement and guidance from staff. Refused oral ativan this morning and continues to refuse.

## 2022-10-02 NOTE — BH NOTE
Patient wandering on the unit with a wet towel over her head, gown wet, refused to change clothes. Taking wet towel from her head and wiping off her door knob and other patients doors. Refused to make eye contact and would turn her head away from staff, hostile facial expressions and loud verbalizations toward staff. Attempting to open other patient's doors, attempting to splash water from a cup onto the staff, computers, and other patients. Scratched nursing staff when trying to redirect. Yelling in [address] and was posturing toward nursing staff, unable to redirect. Medicated with IM medications per order.

## 2022-10-02 NOTE — BH NOTE
Patient placed in restraints to prevent injury to self and others and taken to the seclusion room with staff at bedside. Medicated with Valium and Benadryl per MD orders. Patient continues to remain agitated as evidenced by continues to [facility] [place] Patient no longer combative and is not trying to get out of restraints. Dr. Presley Kwan notified.

## 2022-10-02 NOTE — BH NOTE
Patient slamming other patient's doors on the unit, attempting to open exit doors, screaming in [place], attempting to kick and slap staff, pushing papers off of counter and attempted to shove computers over at the nurses station. Unable to calm patient down,  also unable to calm patient down. Security notified of need for assistance and code violet called.

## 2022-10-02 NOTE — PLAN OF CARE
Problem: Psychosis  Goal: Will report no hallucinations or delusions  Description: INTERVENTIONS:  1. Administer medication as  ordered  2. Assist with reality testing to support increasing orientation  3. Assess if patient's hallucinations or delusions are encouraging self harm or harm to others and intervene as appropriate  10/2/2022 1537 by Owen Martel RN  Outcome: Progressing  10/2/2022 0522 by Nancy Mayfield RN  Outcome: Not Progressing     Problem: Sleep Disturbance  Goal: Will exhibit normal sleeping pattern  Description: INTERVENTIONS:  1. Administer medication as ordered  2. Decrease environmental stimuli, including noise, as appropriate  3.  Discourage social isolation and naps during the day  10/2/2022 1537 by Owen Martel RN  Outcome: Progressing  10/2/2022 0522 by Nancy Mayfield RN  Outcome: Not Progressing     Problem: Nutrition Deficit:  Goal: Optimize nutritional status  10/2/2022 1537 by Owen Martel RN  Outcome: Progressing  10/2/2022 0522 by Nancy Mayfield RN  Outcome: Not Progressing

## 2022-10-03 PROCEDURE — 1240000000 HC EMOTIONAL WELLNESS R&B

## 2022-10-03 ASSESSMENT — PAIN SCALES - WONG BAKER: WONGBAKER_NUMERICALRESPONSE: 0

## 2022-10-03 ASSESSMENT — PAIN SCALES - GENERAL: PAINLEVEL_OUTOF10: 0

## 2022-10-03 NOTE — PROGRESS NOTES
Sandra remained at her room door threshold most of the shift. Calm, but not friendly. She was not aggressive towards staff or peers. She did not participate in conversation with staff, but did listen while this writer spoke to her via [employer]. She refused vitals & scheduled meds. Injections were not necessary this shift. She would not come to the phone when [facility] called. Laid down in bed a few times, but did not sleep. At 0530, she came out of her room and began to sprinkle water on the floor. She was able to be re-directed and went back into her room.

## 2022-10-03 NOTE — PLAN OF CARE
Problem: Psychosis  Goal: Will report no hallucinations or delusions  Description: INTERVENTIONS:  1. Administer medication as  ordered  2. Assist with reality testing to support increasing orientation  3. Assess if patient's hallucinations or delusions are encouraging self harm or harm to others and intervene as appropriate  10/3/2022 0242 by Jordan Cerna RN  Outcome: Not Progressing  10/2/2022 1537 by Vinay Harris RN  Outcome: Progressing     Problem: Sleep Disturbance  Goal: Will exhibit normal sleeping pattern  Description: INTERVENTIONS:  1. Administer medication as ordered  2. Decrease environmental stimuli, including noise, as appropriate  3.  Discourage social isolation and naps during the day  10/3/2022 0242 by Jordan Cerna RN  Outcome: Not Progressing  10/2/2022 1537 by Vinay Harris RN  Outcome: Progressing     Problem: Nutrition Deficit:  Goal: Optimize nutritional status  10/3/2022 0242 by Jordan Cerna RN  Outcome: Not Progressing  10/2/2022 1537 by Vinay Harris RN  Outcome: Progressing

## 2022-10-03 NOTE — PROGRESS NOTES
Department of Psychiatry  AttendingProgress Note  Chief Complaint: psychosis  Ratna Echeverria was seen with . She was in bed and was non communicative. She would not respond to the . Was in bed and made minimal eye contact and appeared to be trembling . She has deteriorated since 9/30 when she was more animated and talkative. Had to be restrained over the weekend and yesterday received Haldol 10 mg prn. Will file for forced med hearing this week. Appears to be drinking fluids but minimal food. Refuses all meds and lab work. Patient's chart was reviewed and collaborated with  about the treatment plan. SUBJECTIVE:    Patient is feeling worse. Suicidal ideation:  DEVIN. Patient  DEVIN  medication side effects. ROS: Patient has new complaints: no  Sleeping adequately:  Yes   Appetite adequate: No:   Attending groups: No:   Visitors:No    OBJECTIVE    Physical  VITALS:  /88   Pulse (!) 125   Temp 97.4 °F (36.3 °C) (Oral)   Resp 18   Ht 5' 4\" (1.626 m)   Wt 179 lb (81.2 kg)   LMP  (LMP Unknown)   SpO2 100%   BMI 30.73 kg/m²     Mental Status Examination:  Patients appearance was ill-appearing. Thoughts are Paucity of Ideas. Homicidal ideations none. No abnormal movements, tics or mannerisms. Memory DEVIN Aims 0. Concentration Unable to Assess. Alert and oriented X 4. Insight and Judgement paranoid ideations. Patient was uncooperative.  Patient gait normal. Mood constricted, affect flat affect Hallucinations Devin, suicidal ideations no specific plan to harm self Speech increased latency of response  Data  Labs:   Admission on 09/23/2022   Component Date Value Ref Range Status    POC Glucose 09/26/2022 80  70 - 99 mg/dl Final    Performed on 09/26/2022 ACCU-CHEK   Final    WBC 09/27/2022 7.8  4.0 - 11.0 K/uL Final    RBC 09/27/2022 4.55  4.00 - 5.20 M/uL Final    Hemoglobin 09/27/2022 13.3  12.0 - 16.0 g/dL Final    Hematocrit 09/27/2022 40.0  36.0 - 48.0 % Final    MCV 09/27/2022 87.9  80.0 - 100.0 fL Final    MCH 09/27/2022 29.3  26.0 - 34.0 pg Final    MCHC 09/27/2022 33.3  31.0 - 36.0 g/dL Final    RDW 09/27/2022 13.1  12.4 - 15.4 % Final    Platelets 98/61/1038 315  135 - 450 K/uL Final    MPV 09/27/2022 7.7  5.0 - 10.5 fL Final    Neutrophils % 09/27/2022 73.1  % Final    Lymphocytes % 09/27/2022 17.4  % Final    Monocytes % 09/27/2022 6.2  % Final    Eosinophils % 09/27/2022 0.6  % Final    Basophils % 09/27/2022 2.7  % Final    Neutrophils Absolute 09/27/2022 5.7  1.7 - 7.7 K/uL Final    Lymphocytes Absolute 09/27/2022 1.4  1.0 - 5.1 K/uL Final    Monocytes Absolute 09/27/2022 0.5  0.0 - 1.3 K/uL Final    Eosinophils Absolute 09/27/2022 0.0  0.0 - 0.6 K/uL Final    Basophils Absolute 09/27/2022 0.2  0.0 - 0.2 K/uL Final    Sodium 09/27/2022 140  136 - 145 mmol/L Final    Potassium reflex Magnesium 09/27/2022 4.1  3.5 - 5.1 mmol/L Final    Chloride 09/27/2022 105  99 - 110 mmol/L Final    CO2 09/27/2022 17 (A)  21 - 32 mmol/L Final    Anion Gap 09/27/2022 18 (A)  3 - 16 Final    Glucose 09/27/2022 95  70 - 99 mg/dL Final    BUN 09/27/2022 13  7 - 20 mg/dL Final    Creatinine 09/27/2022 <0.5 (A)  0.6 - 1.1 mg/dL Final    GFR Non- 09/27/2022 >60  >60 Final    Comment: >60 mL/min/1.73m2 EGFR, calc. for ages 25 and older using the  MDRD formula (not corrected for weight), is valid for stable  renal function. GFR  09/27/2022 >60  >60 Final    Comment: Chronic Kidney Disease: less than 60 ml/min/1.73 sq.m. Kidney Failure: less than 15 ml/min/1.73 sq.m. Results valid for patients 18 years and older.       Calcium 09/27/2022 9.6  8.3 - 10.6 mg/dL Final    POC Glucose 09/30/2022 124 (A)  70 - 99 mg/dl Final    Performed on 09/30/2022 ACCU-CHEK   Final            Medications  Current Facility-Administered Medications: haloperidol lactate (HALDOL) injection 10 mg, 10 mg, IntraMUSCular, Q6H PRN **AND** diazePAM (VALIUM) injection 5 mg, 5 mg, IntraMUSCular, Q6H PRN **AND** diphenhydrAMINE (BENADRYL) injection 50 mg, 50 mg, IntraMUSCular, Q6H PRN  OLANZapine (ZYPREXA) tablet 10 mg, 10 mg, Oral, Nightly  magnesium hydroxide (MILK OF MAGNESIA) 400 MG/5ML suspension 30 mL, 30 mL, Oral, Daily PRN  nicotine polacrilex (COMMIT) lozenge 2 mg, 2 mg, Oral, Q1H PRN  aluminum & magnesium hydroxide-simethicone (MAALOX) 200-200-20 MG/5ML suspension 30 mL, 30 mL, Oral, Q6H PRN  acetaminophen (TYLENOL) tablet 650 mg, 650 mg, Oral, Q4H PRN  LORazepam (ATIVAN) tablet 2 mg, 2 mg, Oral, BID    ASSESSMENT AND PLAN    Principal Problem:    Bipolar affective disorder, current episode manic with psychotic symptoms (HCC)  Active Problems:    Hypokalemia  Resolved Problems:    * No resolved hospital problems. *       1. Patient s symptoms   are worsening  2. Probable discharge is next week  3. Discharge planning is incomplete  4. Suicidal ideation is  DEVIN  5. Total time with patient was 40 minutes and more than 50 % of that time was spent counseling the patient on their symptoms, treatment and expected goals.

## 2022-10-04 PROCEDURE — 6370000000 HC RX 637 (ALT 250 FOR IP): Performed by: PSYCHIATRY & NEUROLOGY

## 2022-10-04 PROCEDURE — 1240000000 HC EMOTIONAL WELLNESS R&B

## 2022-10-04 RX ORDER — RISPERIDONE 1 MG/1
2 TABLET, ORALLY DISINTEGRATING ORAL ONCE
Status: DISCONTINUED | OUTPATIENT
Start: 2022-10-04 | End: 2022-10-07

## 2022-10-04 RX ORDER — OLANZAPINE 10 MG/1
10 TABLET, ORALLY DISINTEGRATING ORAL NIGHTLY
Status: DISCONTINUED | OUTPATIENT
Start: 2022-10-04 | End: 2022-10-06

## 2022-10-04 RX ORDER — OLANZAPINE 10 MG/1
10 TABLET, ORALLY DISINTEGRATING ORAL ONCE
Status: DISCONTINUED | OUTPATIENT
Start: 2022-10-04 | End: 2022-10-04

## 2022-10-04 ASSESSMENT — PAIN SCALES - GENERAL: PAINLEVEL_OUTOF10: 0

## 2022-10-04 NOTE — PROGRESS NOTES
Department of Psychiatry  AttendingProgress Note  Chief Complaint: psychosis  Naima Dockery continues to refuse medication. She was standing in her room today , repeatedly hitting the call light . She was eventually locked out of room . The  has been working extensively to communicate with Abraham Santos as she has been resistant, Was offered her Ativan Risperdal Mtab today and she refused. We had her speak with her mother and she did speak with her  but mother was unable to get her to comply with meds. Naima Dockery would speak with nadeem today and would only say \"no\" when meds were offered. Appears paranoid with staff and . She drink approx 6 oz Coke while standing in front of me. Will encourage Risperdal Mtab and if able to take, will work on Metabolon after forced med hearing. Will request a forced med hearing due to ongoing med refusal and ongoing psychosis  Patient's chart was reviewed and collaborated with  about the treatment plan. SUBJECTIVE:    Patient is feeling unchanged. Suicidal ideation:  DEVIN. Patient  DEVIN  medication side effects. ROS: Patient has new complaints: no  Sleeping adequately:  Yes   Appetite adequate: No:   Attending groups: No:   Visitors:No    OBJECTIVE    Physical  VITALS:  BP (!) 150/99   Pulse 83   Temp 98.2 °F (36.8 °C) (Oral)   Resp 18   Ht 5' 4\" (1.626 m)   Wt 179 lb (81.2 kg)   LMP  (LMP Unknown)   SpO2 97%   BMI 30.73 kg/m²     Mental Status Examination:  Patients appearance was ill-appearing. Thoughts are Illogical. Homicidal ideations none. No abnormal movements, tics or mannerisms. Memory DEVIN Aims 0. Concentration Fair. Alert and oriented X 4. Insight and Judgement paranoid ideations. Patient was uncooperative.  Patient gait normal. Mood constricted, affect flat affect Hallucinations DEVIN, suicidal ideations no specific plan to harm self Speech refusal to speak  Data  Labs:   Admission on 09/23/2022   Component Date Value Ref Range Status POC Glucose 09/26/2022 80  70 - 99 mg/dl Final    Performed on 09/26/2022 ACCU-CHEK   Final    WBC 09/27/2022 7.8  4.0 - 11.0 K/uL Final    RBC 09/27/2022 4.55  4.00 - 5.20 M/uL Final    Hemoglobin 09/27/2022 13.3  12.0 - 16.0 g/dL Final    Hematocrit 09/27/2022 40.0  36.0 - 48.0 % Final    MCV 09/27/2022 87.9  80.0 - 100.0 fL Final    MCH 09/27/2022 29.3  26.0 - 34.0 pg Final    MCHC 09/27/2022 33.3  31.0 - 36.0 g/dL Final    RDW 09/27/2022 13.1  12.4 - 15.4 % Final    Platelets 02/40/7086 315  135 - 450 K/uL Final    MPV 09/27/2022 7.7  5.0 - 10.5 fL Final    Neutrophils % 09/27/2022 73.1  % Final    Lymphocytes % 09/27/2022 17.4  % Final    Monocytes % 09/27/2022 6.2  % Final    Eosinophils % 09/27/2022 0.6  % Final    Basophils % 09/27/2022 2.7  % Final    Neutrophils Absolute 09/27/2022 5.7  1.7 - 7.7 K/uL Final    Lymphocytes Absolute 09/27/2022 1.4  1.0 - 5.1 K/uL Final    Monocytes Absolute 09/27/2022 0.5  0.0 - 1.3 K/uL Final    Eosinophils Absolute 09/27/2022 0.0  0.0 - 0.6 K/uL Final    Basophils Absolute 09/27/2022 0.2  0.0 - 0.2 K/uL Final    Sodium 09/27/2022 140  136 - 145 mmol/L Final    Potassium reflex Magnesium 09/27/2022 4.1  3.5 - 5.1 mmol/L Final    Chloride 09/27/2022 105  99 - 110 mmol/L Final    CO2 09/27/2022 17 (A)  21 - 32 mmol/L Final    Anion Gap 09/27/2022 18 (A)  3 - 16 Final    Glucose 09/27/2022 95  70 - 99 mg/dL Final    BUN 09/27/2022 13  7 - 20 mg/dL Final    Creatinine 09/27/2022 <0.5 (A)  0.6 - 1.1 mg/dL Final    GFR Non- 09/27/2022 >60  >60 Final    Comment: >60 mL/min/1.73m2 EGFR, calc. for ages 25 and older using the  MDRD formula (not corrected for weight), is valid for stable  renal function. GFR  09/27/2022 >60  >60 Final    Comment: Chronic Kidney Disease: less than 60 ml/min/1.73 sq.m. Kidney Failure: less than 15 ml/min/1.73 sq.m. Results valid for patients 18 years and older.       Calcium 09/27/2022 9.6  8.3 - 10.6 mg/dL Final    POC Glucose 09/30/2022 124 (A)  70 - 99 mg/dl Final    Performed on 09/30/2022 ACCU-CHEK   Final            Medications  Current Facility-Administered Medications: OLANZapine zydis (ZYPREXA) disintegrating tablet 10 mg, 10 mg, Oral, Nightly  risperiDONE (RISPERDAL M-TABS) disintegrating tablet 2 mg, 2 mg, Oral, Once  haloperidol lactate (HALDOL) injection 10 mg, 10 mg, IntraMUSCular, Q6H PRN **AND** diazePAM (VALIUM) injection 5 mg, 5 mg, IntraMUSCular, Q6H PRN **AND** diphenhydrAMINE (BENADRYL) injection 50 mg, 50 mg, IntraMUSCular, Q6H PRN  magnesium hydroxide (MILK OF MAGNESIA) 400 MG/5ML suspension 30 mL, 30 mL, Oral, Daily PRN  nicotine polacrilex (COMMIT) lozenge 2 mg, 2 mg, Oral, Q1H PRN  aluminum & magnesium hydroxide-simethicone (MAALOX) 200-200-20 MG/5ML suspension 30 mL, 30 mL, Oral, Q6H PRN  acetaminophen (TYLENOL) tablet 650 mg, 650 mg, Oral, Q4H PRN  LORazepam (ATIVAN) tablet 2 mg, 2 mg, Oral, BID    ASSESSMENT AND PLAN    Principal Problem:    Bipolar affective disorder, current episode manic with psychotic symptoms ([address]  Active Problems:    Hypokalemia  Resolved Problems:    * No resolved hospital problems. *       1. Patient s symptoms   show no change  2. Probable discharge is DEVIN  3. Discharge planning is incomplete  4. Suicidal ideation is  Devin  5. Total time with patient was 40 minutes and more than 50 % of that time was spent counseling the patient on their symptoms, treatment and expected goals.

## 2022-10-04 NOTE — PLAN OF CARE
Problem: Pain  Goal: Verbalizes/displays adequate comfort level or baseline comfort level  Outcome: Progressing     Problem: Safety - Adult  Goal: Free from fall injury  Outcome: Progressing     Problem: Psychosis  Goal: Will report no hallucinations or delusions  Description: INTERVENTIONS:  1. Administer medication as  ordered  2. Assist with reality testing to support increasing orientation  3. Assess if patient's hallucinations or delusions are encouraging self harm or harm to others and intervene as appropriate  Outcome: Not Progressing     Problem: Sleep Disturbance  Goal: Will exhibit normal sleeping pattern  Description: INTERVENTIONS:  1. Administer medication as ordered  2. Decrease environmental stimuli, including noise, as appropriate  3. Discourage social isolation and naps during the day  Outcome: Progressing     Problem: Nutrition Deficit:  Goal: Optimize nutritional status  Outcome: Not Progressing   Pt has seemed to have a difficult time tonight. She has been sleeping more. Continues to refuse meds and VS. When pt did awaken she would not engage at all except to make some eye contact. Answered no questions. Would not even shake her head yes or no. Made no attempt to talk at all so far this shift. Pt has had no c/o pain and had no visual or audible signs of pain. She has been FLACC 0. When awake, appears at time to RTIS. Pt appears very sad. Nutritionally pt may be eating when staff unaware, but if so this has not been witnessed. Pt was seen drinking at times. Pt continues as a non-fall risk.

## 2022-10-04 NOTE — PROGRESS NOTES
Comprehensive Nutrition Assessment    Type and Reason for Visit:  Reassess    Nutrition Recommendations/Plan:   Continue ADULT DIET; Regular diet order - please document meal intake % or refusal of meals in flow sheets for each meal.   Continue Ensure high-protein with meals - please document ONS intake % or refusal to consume ONS in flow sheets. Monitor mental status + appetite, meal intake, and acceptance/intake of ONS. Please obtain an updated (actual) weight for this patient and document weight method - last weight obtained on 9/30/22 and weight method was not specified. Monitor bowel function and weight trends. Malnutrition Assessment:  Malnutrition Status: At risk for malnutrition (10/04/22 1630)    Context:  Acute Illness     Findings of the 6 clinical characteristics of malnutrition:  Energy Intake:  50% or less of estimated energy requirements for 5 or more days  Weight Loss:  Unable to assess     Body Fat Loss:  Unable to assess     Muscle Mass Loss:  Unable to assess    Fluid Accumulation:  No significant fluid accumulation     Strength:  Not Performed    Nutrition Assessment:    patient remains unchanged from a nutritional standpoint since last RD assessment; patient remains at risk for further compromise d/t inadequate nutrition intake and ongoing mental health compromise + refusal of medications; will continue ADULT DIET; Regular diet order and Ensure high-protein with meals    Nutrition Related Findings:    patient's mental status remains unchanged; she continues to refuse medications and is not consuming po nutrition in front of staff; patient did consume ~ 6 oz of coke in front of Dr. Emile Loza this afternoon, per his note; +  present; patient has refused to allow visitors on the unit Wound Type: None       Current Nutrition Intake & Therapies:    Average Meal Intake: 0%, Refusing to eat  Average Supplements Intake: Unable to assess  ADULT DIET;  Regular  ADULT ORAL NUTRITION SUPPLEMENT; Breakfast, Lunch, Dinner; Low Calorie/High Protein Oral Supplement    Anthropometric Measures:  Height: 5' 4\" (162.6 cm)  Ideal Body Weight (IBW): 120 lbs (55 kg)    Admission Body Weight: 179 lb (81.2 kg) (obtained on 9/30/22; weight method not specified)  Current Body Weight: 179 lb (81.2 kg) (obtained on 9/30/22; weight method not specified), 149.2 % IBW. Weight Source: Not Specified  Current BMI (kg/m2): 30.7  Usual Body Weight:  (no weight hx in EMR)     Weight Adjustment For: No Adjustment                 BMI Categories: Obese Class 1 (BMI 30.0-34. 9)    Estimated Daily Nutrient Needs:  Energy Requirements Based On: Kcal/kg  Weight Used for Energy Requirements: Current  Energy (kcal/day): 6870 - 1458 kcals based on 15-18 kcals/kg/CBW  Weight Used for Protein Requirements: Ideal  Protein (g/day): 55 - 66 g protein based on 1.0-1.2 g/kg/IBW  Method Used for Fluid Requirements: 1 ml/kcal  Fluid (ml/day): 1215 - 1458 ml    Nutrition Diagnosis:   Inadequate oral intake related to inadequate protein-energy intake, psychological cause or life stress as evidenced by poor intake prior to admission, other (comment) (mental health compromise)    Nutrition Interventions:   Food and/or Nutrient Delivery: Continue Current Diet, Continue Oral Nutrition Supplement  Nutrition Education/Counseling: No recommendation at this time  Coordination of Nutrition Care: Continue to monitor while inpatient, Coordination of Care       Goals:  Previous Goal Met: No Progress toward Goal(s)  Goals: PO intake 50% or greater, by next RD assessment       Nutrition Monitoring and Evaluation:   Behavioral-Environmental Outcomes: None Identified  Food/Nutrient Intake Outcomes: Food and Nutrient Intake, Supplement Intake  Physical Signs/Symptoms Outcomes: Meal Time Behavior, Skin, Weight    Discharge Planning:    Continue current diet     David Painting LD  Contact: [phone]

## 2022-10-05 PROCEDURE — 1240000000 HC EMOTIONAL WELLNESS R&B

## 2022-10-05 ASSESSMENT — PAIN SCALES - WONG BAKER: WONGBAKER_NUMERICALRESPONSE: 0

## 2022-10-05 ASSESSMENT — PAIN SCALES - GENERAL: PAINLEVEL_OUTOF10: 0

## 2022-10-05 NOTE — BH NOTE
Late entry for 10/3/22 @ 1831  Pt stayed in her room all day. She was awake at times and would look out the door occasionally. Writer and  Zulay Newman in room several times and pt refused to answer any questions. She would become irritable and tell us to get out. She was offered food and drink. Writer asked if there was anything we could do for her. She was offered assistance with a shower. She refused all. She was offered to use the phone to call her family and she refused. She refused vital signs and medications. She has snacks and water in her room, but has not been noted to be consuming them. She is noted to be using the bathroom on occasion. She does not appear to be RTIS, and has not attempted to harm herself or others.

## 2022-10-05 NOTE — PLAN OF CARE
Problem: Safety - Adult  Goal: Free from fall injury  Outcome: Progressing     Problem: Psychosis  Goal: Will report no hallucinations or delusions  Description: INTERVENTIONS:  1. Administer medication as  ordered  2. Assist with reality testing to support increasing orientation  3. Assess if patient's hallucinations or delusions are encouraging self harm or harm to others and intervene as appropriate  Outcome: Not Progressing     Problem: Sleep Disturbance  Goal: Will exhibit normal sleeping pattern  Description: INTERVENTIONS:  1. Administer medication as ordered  2. Decrease environmental stimuli, including noise, as appropriate  3. Discourage social isolation and naps during the day  Outcome: Progressing     Problem: Nutrition Deficit:  Goal: Optimize nutritional status  Outcome: Not Progressing   Pt asleep much of the shift. Was awake for approx 1.5 hours. Pt would not talk except an occasional \"no. \" Pt was drinking water, but refused anything else. Sandra does not report any hallucinations or delusions. However she is having persecutory and paranoid delusions. She appears to be hallucination at times and demonstrates RTIS. Pt refusing VS, meds, and more nourishment. Pt has snacks in her room but did not see her eating.

## 2022-10-05 NOTE — CONSULTS
Department of Psychiatry  Consult Regarding the Patient's Capacity to Give or Withhold Consent to Medication    Sources of Information:  - chart review  - attempted interviews with the patient on 9/24/2022 and 10/4/2022     From my original note dated 8/2/2022: According to the [facility] notes:  Her  called reporting that the patient had not slept in days, was paranoid and fearful, and hallucinating. He told them she was afraid people may harm her. He said she had been taking herbal supplements from her mother but no other treatment for several weeks. She was unwilling to go with him to the ER so he called the [facility]. Ultimately she was brought to  by police. When the  [facility] tried to meet with her she became fearful and declined to speak with them. She tried to prevent her  from speaking with them too. She became tearful and cried \"no, no, no!!\" She was \"selectively mute\" and \"showing signs of catatonia. \"     According to our admitting RN note:  Pt arrived via stretcher at 0945. She looked around suspiciously and began to cry. She speaks little [place] and ambulance personnel was using a translation neto to talk with her. She told them she was afraid to be here and began to cry. Attempts to calm the pt were ineffective. The  was utilized to speak to her. Through  #[id number] The pt was able to say that she didn't know why she was here. She states she was asleep when the police came and took her to the hospital. She states nothing is wrong with her. She did eventually talk some. She states she is from [facility], is  and has 2 boys age 6 and 15. She continues to say she didn't want to be here, but was more calm. She has been walking around the unit, not interactive. She was offered a breakfast tray and did sit to eat it. She declined to answer anymore questions and efforts are being made to get an  to come in and sit with her and assist with the admission process. She does deny voices or visual hallucinations. She denies any thoughts of harming  herself or others. Later in the day yesterday:  Victor Hugo Lemus remains intermittently upset. She took a nap for about 1 hour this afternoon. She ate a few bites of her lunch. She will not talk to staff even using the . She yells that she doesn't want to be here. Pt became upset when making a call and it went to voice mail. She began to wail loudly. Staff unable to console her. Call placed to pts  and she accepted it. She began to yell at him. Once done her  spoke with writer and he states she is upset that she has no towels or shampoo in her room and she wants to shower. Everything was provided for personal care and pt was calm. She refused her clothing because they are \"not clean. \" Her  stated he would be in, but after visiting hours. He arrived after 1630 and he and pt are in [employer]. She yells at times, but is calm now. She continues to refuse her vital signs and assessment. I attempted to meet with her yesterday and this morning. She declined to speak with me, looked fearful, and walked away. She has been behaviorally stable since yesterday afternoon. She has not accepted medication since admission. OBJECTIVE:  VITALS:  BP (!) 150/99   Pulse 83   Temp 98.2 °F (36.8 °C) (Oral)   Resp 18   Ht 5' 4\" (1.626 m)   Wt 179 lb (81.2 kg)   LMP  (LMP Unknown)   SpO2 97%   BMI 30.73 kg/m²     Mental Status Examination:    Appearance: poor hygiene  Behavior/Attitude toward examiner:  fearful   Speech: poverty   Mood:  unable to assess  Affect:  irritable  Thought processes:  unable to assess   Thought Content: + paranoid. Perceptions: ? AVH, ?  RTIS  Attention: impaired  Abstraction: unable to assess   Cognition: unable to assess   Insight: impaired  Judgment: impaired    Medication:  none    Formulation:  Ms. Gorge Garcia is a domiciled, , and unemployed Citizen of [place] speaking 30yo with a chart history of bipolar I disorder and psychotic symptoms who was brought to [facility]  under a Statement of Belief by police after her  called the [facility] because of worsening psychotic behavior. She was transferred here for further evaluation and treatment. Principal Problem:    Bipolar affective disorder, current episode manic with psychotic symptoms ([address]  Active Problems:    Hypokalemia  Resolved Problems:    * No resolved hospital problems. *     It is my opinion, with reasonable medical certainty, that Ms. Lo Carreon lacks the capacity to give or without consent to medication. My opinion is based on the following:    - she is not able to have reasonable discussion about her diagnosis. - she is not able to have a reasonable discussion about the proposed treatment options. - she is not able to have a reasonable discussion about the potential risk, benefits, and side effects of the proposed treatment options. - she is not able to have a reasonable discussion about the potential risks of not accepting treatment.      Sincerely,    Nikolai Aleman MD

## 2022-10-05 NOTE — CONSULTS
Psychiatric Consult    Admit Date:  9/23/2022    Consult Date:  10/5/2022   Reason for Consult: Forced medication hearing  Summary Present Illness: Pt is 28yo with a history of bipolar I disorder and psychotic symptoms who was brought to [facility]  under a Statement of Belief by police after her  called the [facility] because of worsening psychotic behavior.  voiced concerns of paranoia, hallucinations, and refusal of all medications for several weeks. Dr. Miguel Mckeon requesting consultation in order to move forward with a forced medication hearing. Now on [facility], pt is refusing to speak with me, appearing paranoid and confused as I attempted to ask her questions. Using the tele interpretor screen, pt would not acknowledge any questions that were asked. I attempted to speak with her for several minutes with no response. She remained postured, almost catatonic, arms outstretched, eyes closed. As I ended the call, pt began yelling \"no\", still no eye contact with me or acknowledgement that I was trying to speak with her. Pt appears disheveled, clothing stained. Pt behaviors are bizarre, she appears to be RTIS. Pt has refused vitals from staff, all medication, labs, and poor fluid and food intake. Pt has shown no improvement while on the unit, seems to be declining physically, but without vitals or labs it is hard to determine the state of her current condition. 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    MSE: Mental Status Examination:  Level of consciousness:  within normal limits  Appearance:  ill-appearing, hospital attire, poor grooming, and poor hygiene well-developed, well-nourished  Behavior/Motor:  agitated and responding to internal stimuli normal gait and station  Attitude toward examiner:  poor eye contact and guarded  Speech:  spontaneous, selectively mute  Mood: constricted and labile  Affect:  flat  Hallucinations: Present - appears to be RTIS  Thought processes:  thought blocking Attention:  attention span appeared shorter than expected for age  Thought content:  DEVIN  Insight: impaired insight  Judgement: impaired judgment  Appetite: not normal, poor intake of fluids and food  Inventory of strengths and weaknesses:Family support  PE: VITALS:  BP (!) 150/99   Pulse 83   Temp 98.2 °F (36.8 °C) (Oral)   Resp 18   Ht 5' 4\" (1.626 m)   Wt 179 lb (81.2 kg)   LMP  (LMP Unknown)   SpO2 97%   BMI 30.73 kg/m²     Cranial Nerves: 1-12 appear to be intact   ROS:  Reviewed ED and Med notes and agree with findings  Labs:    Admission on 09/23/2022   Component Date Value Ref Range Status    POC Glucose 09/26/2022 80  70 - 99 mg/dl Final    Performed on 09/26/2022 ACCU-CHEK   Final    WBC 09/27/2022 7.8  4.0 - 11.0 K/uL Final    RBC 09/27/2022 4.55  4.00 - 5.20 M/uL Final    Hemoglobin 09/27/2022 13.3  12.0 - 16.0 g/dL Final    Hematocrit 09/27/2022 40.0  36.0 - 48.0 % Final    MCV 09/27/2022 87.9  80.0 - 100.0 fL Final    MCH 09/27/2022 29.3  26.0 - 34.0 pg Final    MCHC 09/27/2022 33.3  31.0 - 36.0 g/dL Final    RDW 09/27/2022 13.1  12.4 - 15.4 % Final    Platelets 29/44/2936 315  135 - 450 K/uL Final    MPV 09/27/2022 7.7  5.0 - 10.5 fL Final    Neutrophils % 09/27/2022 73.1  % Final    Lymphocytes % 09/27/2022 17.4  % Final    Monocytes % 09/27/2022 6.2  % Final    Eosinophils % 09/27/2022 0.6  % Final    Basophils % 09/27/2022 2.7  % Final    Neutrophils Absolute 09/27/2022 5.7  1.7 - 7.7 K/uL Final    Lymphocytes Absolute 09/27/2022 1.4  1.0 - 5.1 K/uL Final    Monocytes Absolute 09/27/2022 0.5  0.0 - 1.3 K/uL Final    Eosinophils Absolute 09/27/2022 0.0  0.0 - 0.6 K/uL Final    Basophils Absolute 09/27/2022 0.2  0.0 - 0.2 K/uL Final    Sodium 09/27/2022 140  136 - 145 mmol/L Final    Potassium reflex Magnesium 09/27/2022 4.1  3.5 - 5.1 mmol/L Final    Chloride 09/27/2022 adherence to medication and treatment of her mental condition.       Spent > 45 minutes evaluating and treating patient     KELLEY Nicole-BC

## 2022-10-05 NOTE — PLAN OF CARE
Problem: Pain  Goal: Verbalizes/displays adequate comfort level or baseline comfort level  Outcome: Not Progressing     Problem: Safety - Adult  Goal: Free from fall injury  10/5/2022 1431 by Keshav Womack LPN  Outcome: Not Progressing  10/5/2022 0904 by Angela Chen RN  Outcome: Progressing     Problem: Psychosis  Goal: Will report no hallucinations or delusions  Description: INTERVENTIONS:  1. Administer medication as  ordered  2. Assist with reality testing to support increasing orientation  3. Assess if patient's hallucinations or delusions are encouraging self harm or harm to others and intervene as appropriate  10/5/2022 1431 by Keshav Womack LPN  Outcome: Not Progressing  10/5/2022 0904 by Angela Chen RN  Outcome: Not Progressing     Problem: Sleep Disturbance  Goal: Will exhibit normal sleeping pattern  Description: INTERVENTIONS:  1. Administer medication as ordered  2. Decrease environmental stimuli, including noise, as appropriate  3. Discourage social isolation and naps during the day  10/5/2022 1431 by Keshav Womack LPN  Outcome: Not Progressing  10/5/2022 0904 by Angela Chen RN  Outcome: Progressing     Problem: Nutrition Deficit:  Goal: Optimize nutritional status  10/5/2022 1431 by Keshav Womack LPN  Outcome: Not Progressing  10/5/2022 0904 by Angela Chen RN  Outcome: Not Progressing     Problem: Skin/Tissue Integrity  Goal: Absence of new skin breakdown  Description: 1. Monitor for areas of redness and/or skin breakdown  2. Assess vascular access sites hourly  3. Every 4-6 hours minimum:  Change oxygen saturation probe site  4. Every 4-6 hours:  If on nasal continuous positive airway pressure, respiratory therapy assess nares and determine need for appliance change or resting period. Outcome: Not Progressing  PT continues to refuse med's, attend groups, or speak to staff.  here attempted to talk to pt with this service.  Pt shakes her head not when  is asking her questions, but no response with this nurses questions.

## 2022-10-05 NOTE — BH NOTE
Writer sent notes from the medical providers Dr. Michael Simental, Dr. Miguel Mckeon, and JULIO C Cuevas and sent to Sophia Odonnell with a request to file a motion for forced medications.     Jadon Gan

## 2022-10-05 NOTE — PROGRESS NOTES
Department of Psychiatry  AttendingProgress Note  Chief Complaint: psychosis  Kaden Escoto was seen along with the . She would not respond verbally to any questions. I asked her to take her medication and she shook her head , to imply no. I explained that taking her med would help to shorten her hospital stay . Silke Gonsalez held the med in front of her and attempted to give the cup to Kaden Escoto and she refused to take the cup. Will follow up with a forced med hearing. Patient's chart was reviewed and collaborated with  about the treatment plan. SUBJECTIVE:    Patient is feeling DEVIN. Suicidal ideation:  Devin. Patient  DEVIN  medication side effects. ROS: Patient has new complaints: no  Sleeping adequately:  Yes   Appetite adequate: No:   Attending groups: No:   Visitors:Yes    OBJECTIVE    Physical  VITALS:  BP (!) 150/99   Pulse 83   Temp 98.2 °F (36.8 °C) (Oral)   Resp 18   Ht 5' 4\" (1.626 m)   Wt 179 lb (81.2 kg)   LMP  (LMP Unknown)   SpO2 97%   BMI 30.73 kg/m²     Mental Status Examination:  Patients appearance was ill-appearing. Thoughts are Paucity of Ideas. Homicidal ideations none. No abnormal movements, tics or mannerisms. Memory DEVIN Aims 0. Concentration Unable to Assess. Alert and oriented X 4. Insight and Judgement impaired insight. Patient was uncooperative. Patient gait on the bed.  Mood constricted, affect flat affect Hallucinations Devin, suicidal ideations no specific plan to harm self Speech refusal to speeak  Data  Labs:   Admission on 09/23/2022   Component Date Value Ref Range Status    POC Glucose 09/26/2022 80  70 - 99 mg/dl Final    Performed on 09/26/2022 ACCU-CHEK   Final    WBC 09/27/2022 7.8  4.0 - 11.0 K/uL Final    RBC 09/27/2022 4.55  4.00 - 5.20 M/uL Final    Hemoglobin 09/27/2022 13.3  12.0 - 16.0 g/dL Final    Hematocrit 09/27/2022 40.0  36.0 - 48.0 % Final    MCV 09/27/2022 87.9  80.0 - 100.0 fL Final    MCH 09/27/2022 29.3  26.0 - 34.0 pg Final    MCHC 09/27/2022 33.3  31.0 - 36.0 g/dL Final    RDW 09/27/2022 13.1  12.4 - 15.4 % Final    Platelets 63/77/3043 315  135 - 450 K/uL Final    MPV 09/27/2022 7.7  5.0 - 10.5 fL Final    Neutrophils % 09/27/2022 73.1  % Final    Lymphocytes % 09/27/2022 17.4  % Final    Monocytes % 09/27/2022 6.2  % Final    Eosinophils % 09/27/2022 0.6  % Final    Basophils % 09/27/2022 2.7  % Final    Neutrophils Absolute 09/27/2022 5.7  1.7 - 7.7 K/uL Final    Lymphocytes Absolute 09/27/2022 1.4  1.0 - 5.1 K/uL Final    Monocytes Absolute 09/27/2022 0.5  0.0 - 1.3 K/uL Final    Eosinophils Absolute 09/27/2022 0.0  0.0 - 0.6 K/uL Final    Basophils Absolute 09/27/2022 0.2  0.0 - 0.2 K/uL Final    Sodium 09/27/2022 140  136 - 145 mmol/L Final    Potassium reflex Magnesium 09/27/2022 4.1  3.5 - 5.1 mmol/L Final    Chloride 09/27/2022 105  99 - 110 mmol/L Final    CO2 09/27/2022 17 (A)  21 - 32 mmol/L Final    Anion Gap 09/27/2022 18 (A)  3 - 16 Final    Glucose 09/27/2022 95  70 - 99 mg/dL Final    BUN 09/27/2022 13  7 - 20 mg/dL Final    Creatinine 09/27/2022 <0.5 (A)  0.6 - 1.1 mg/dL Final    GFR Non- 09/27/2022 >60  >60 Final    Comment: >60 mL/min/1.73m2 EGFR, calc. for ages 25 and older using the  MDRD formula (not corrected for weight), is valid for stable  renal function. GFR  09/27/2022 >60  >60 Final    Comment: Chronic Kidney Disease: less than 60 ml/min/1.73 sq.m. Kidney Failure: less than 15 ml/min/1.73 sq.m. Results valid for patients 18 years and older.       Calcium 09/27/2022 9.6  8.3 - 10.6 mg/dL Final    POC Glucose 09/30/2022 124 (A)  70 - 99 mg/dl Final    Performed on 09/30/2022 ACCU-CHEK   Final            Medications  Current Facility-Administered Medications: OLANZapine zydis (ZYPREXA) disintegrating tablet 10 mg, 10 mg, Oral, Nightly  risperiDONE (RISPERDAL M-TABS) disintegrating tablet 2 mg, 2 mg, Oral, Once  haloperidol lactate (HALDOL) injection 10 mg, 10 mg, IntraMUSCular, Q6H PRN **AND** diazePAM (VALIUM) injection 5 mg, 5 mg, IntraMUSCular, Q6H PRN **AND** diphenhydrAMINE (BENADRYL) injection 50 mg, 50 mg, IntraMUSCular, Q6H PRN  magnesium hydroxide (MILK OF MAGNESIA) 400 MG/5ML suspension 30 mL, 30 mL, Oral, Daily PRN  nicotine polacrilex (COMMIT) lozenge 2 mg, 2 mg, Oral, Q1H PRN  aluminum & magnesium hydroxide-simethicone (MAALOX) 200-200-20 MG/5ML suspension 30 mL, 30 mL, Oral, Q6H PRN  acetaminophen (TYLENOL) tablet 650 mg, 650 mg, Oral, Q4H PRN  LORazepam (ATIVAN) tablet 2 mg, 2 mg, Oral, BID    ASSESSMENT AND PLAN    Principal Problem:    Bipolar affective disorder, current episode manic with psychotic symptoms (HCC)  Active Problems:    Hypokalemia  Resolved Problems:    * No resolved hospital problems. *       1. Patient s symptoms   show no change  2. Probable discharge is UTD  3. Discharge planning is incomplete  4. Suicidal ideation is  DEVIN  5. Total time with patient was 40 minutes and more than 50 % of that time was spent counseling the patient on their symptoms, treatment and expected goals.

## 2022-10-06 PROCEDURE — 6370000000 HC RX 637 (ALT 250 FOR IP): Performed by: PSYCHIATRY & NEUROLOGY

## 2022-10-06 PROCEDURE — 1240000000 HC EMOTIONAL WELLNESS R&B

## 2022-10-06 RX ORDER — PALIPERIDONE 3 MG/1
3 TABLET, EXTENDED RELEASE ORAL DAILY
Status: DISCONTINUED | OUTPATIENT
Start: 2022-10-06 | End: 2022-10-07

## 2022-10-06 RX ADMIN — LORAZEPAM 2 MG: 2 TABLET ORAL at 19:29

## 2022-10-06 RX ADMIN — PALIPERIDONE 3 MG: 3 TABLET, EXTENDED RELEASE ORAL at 19:29

## 2022-10-06 NOTE — PLAN OF CARE
Cassie Barrow is up in her room walking around. She spoke with this writer with the  assist. She denies SI/HI and any hallucinations. She has not been noted to be RTIS this am. She denies that she has any pain. Writer offered to assist with shower and she declined. She declined all food and drink including the snacks her  brought in. When asked why she will not eat, she states \"no. \" She stated she did not want to be better. She was offered the phone to call family and she declined. She was able to answer assessment questions, but refused the physical part of the exam. She refused to go to the [employer] hearing. It was thoroughly explained through the . Problem: Psychosis  Goal: Will report no hallucinations or delusions  Description: INTERVENTIONS:  1. Administer medication as  ordered  2. Assist with reality testing to support increasing orientation  3. Assess if patient's hallucinations or delusions are encouraging self harm or harm to others and intervene as appropriate  10/6/2022 1057 by Mer Hdez RN  Outcome: Not Progressing  10/5/2022 2332 by Rosalio Aaron RN  Outcome: Not Progressing     Problem: Sleep Disturbance  Goal: Will exhibit normal sleeping pattern  Description: INTERVENTIONS:  1. Administer medication as ordered  2. Decrease environmental stimuli, including noise, as appropriate  3. Discourage social isolation and naps during the day  10/6/2022 1057 by Mer Hdez RN  Outcome: Not Progressing  10/5/2022 2332 by Rosalio Aaron RN  Outcome: Not Progressing     Problem: Nutrition Deficit:  Goal: Optimize nutritional status  10/6/2022 1057 by Mer Hdez RN  Outcome: Not Progressing  10/5/2022 2332 by Rosalio Aaron RN  Outcome: Not Progressing     Problem: Skin/Tissue Integrity  Goal: Absence of new skin breakdown  Description: 1. Monitor for areas of redness and/or skin breakdown  2. Assess vascular access sites hourly  3.   Every 4-6 hours minimum:  Change oxygen saturation probe site  4. Every 4-6 hours:  If on nasal continuous positive airway pressure, respiratory therapy assess nares and determine need for appliance change or resting period.   10/6/2022 1057 by Zeeshan Gay RN  Outcome: Not Progressing  10/5/2022 2332 by Deandre Gongora RN  Outcome: Progressing

## 2022-10-06 NOTE — PROGRESS NOTES
10.5 fL Final    Neutrophils % 09/27/2022 73.1  % Final    Lymphocytes % 09/27/2022 17.4  % Final    Monocytes % 09/27/2022 6.2  % Final    Eosinophils % 09/27/2022 0.6  % Final    Basophils % 09/27/2022 2.7  % Final    Neutrophils Absolute 09/27/2022 5.7  1.7 - 7.7 K/uL Final    Lymphocytes Absolute 09/27/2022 1.4  1.0 - 5.1 K/uL Final    Monocytes Absolute 09/27/2022 0.5  0.0 - 1.3 K/uL Final    Eosinophils Absolute 09/27/2022 0.0  0.0 - 0.6 K/uL Final    Basophils Absolute 09/27/2022 0.2  0.0 - 0.2 K/uL Final    Sodium 09/27/2022 140  136 - 145 mmol/L Final    Potassium reflex Magnesium 09/27/2022 4.1  3.5 - 5.1 mmol/L Final    Chloride 09/27/2022 105  99 - 110 mmol/L Final    CO2 09/27/2022 17 (A)  21 - 32 mmol/L Final    Anion Gap 09/27/2022 18 (A)  3 - 16 Final    Glucose 09/27/2022 95  70 - 99 mg/dL Final    BUN 09/27/2022 13  7 - 20 mg/dL Final    Creatinine 09/27/2022 <0.5 (A)  0.6 - 1.1 mg/dL Final    GFR Non- 09/27/2022 >60  >60 Final    Comment: >60 mL/min/1.73m2 EGFR, calc. for ages 25 and older using the  MDRD formula (not corrected for weight), is valid for stable  renal function. GFR  09/27/2022 >60  >60 Final    Comment: Chronic Kidney Disease: less than 60 ml/min/1.73 sq.m. Kidney Failure: less than 15 ml/min/1.73 sq.m. Results valid for patients 18 years and older.       Calcium 09/27/2022 9.6  8.3 - 10.6 mg/dL Final    POC Glucose 09/30/2022 124 (A)  70 - 99 mg/dl Final    Performed on 09/30/2022 ACCU-CHEK   Final            Medications  Current Facility-Administered Medications: OLANZapine zydis (ZYPREXA) disintegrating tablet 10 mg, 10 mg, Oral, Nightly  risperiDONE (RISPERDAL M-TABS) disintegrating tablet 2 mg, 2 mg, Oral, Once  haloperidol lactate (HALDOL) injection 10 mg, 10 mg, IntraMUSCular, Q6H PRN **AND** diazePAM (VALIUM) injection 5 mg, 5 mg, IntraMUSCular, Q6H PRN **AND** diphenhydrAMINE (BENADRYL) injection 50 mg, 50 mg, IntraMUSCular, Q6H PRN  magnesium hydroxide (MILK OF MAGNESIA) 400 MG/5ML suspension 30 mL, 30 mL, Oral, Daily PRN  nicotine polacrilex (COMMIT) lozenge 2 mg, 2 mg, Oral, Q1H PRN  aluminum & magnesium hydroxide-simethicone (MAALOX) 200-200-20 MG/5ML suspension 30 mL, 30 mL, Oral, Q6H PRN  acetaminophen (TYLENOL) tablet 650 mg, 650 mg, Oral, Q4H PRN  LORazepam (ATIVAN) tablet 2 mg, 2 mg, Oral, BID    ASSESSMENT AND PLAN    Principal Problem:    Bipolar affective disorder, current episode manic with psychotic symptoms (HCC)  Active Problems:    Hypokalemia  Resolved Problems:    * No resolved hospital problems. *       1. Patient s symptoms   show no change  2. Probable discharge is UTD  3. Discharge planning is incomplete  4. Suicidal ideation is  DEVIN  5. Total time with patient was 40 minutes and more than 50 % of that time was spent counseling the patient on their symptoms, treatment and expected goals.

## 2022-10-06 NOTE — PLAN OF CARE
Problem: Safety - Adult  Goal: Free from fall injury  10/5/2022 2332 by Wilfredo Gregory RN  Outcome: Progressing     Problem: Psychosis  Goal: Will report no hallucinations or delusions  Description: INTERVENTIONS:  1. Administer medication as  ordered  2. Assist with reality testing to support increasing orientation  3. Assess if patient's hallucinations or delusions are encouraging self harm or harm to others and intervene as appropriate  10/5/2022 2332 by Wilfredo Gregory RN  Outcome: Not Progressing     Problem: Sleep Disturbance  Goal: Will exhibit normal sleeping pattern  Description: INTERVENTIONS:  1. Administer medication as ordered  2. Decrease environmental stimuli, including noise, as appropriate  3. Discourage social isolation and naps during the day  10/5/2022 2332 by Wilfredo Gregory RN  Outcome: Not Progressing     Problem: Nutrition Deficit:  Goal: Optimize nutritional status  10/5/2022 2332 by Wilfredo Gregory RN  Outcome: Not Progressing     Problem: Skin/Tissue Integrity  Goal: Absence of new skin breakdown  Description: 1. Monitor for areas of redness and/or skin breakdown  2. Assess vascular access sites hourly  3. Every 4-6 hours minimum:  Change oxygen saturation probe site  4. Every 4-6 hours:  If on nasal continuous positive airway pressure, respiratory therapy assess nares and determine need for appliance change or resting period.   10/5/2022 2332 by Wilfredo Gregory RN  Outcome: Progressing

## 2022-10-06 NOTE — BH NOTE
Writer facilitated forced medication hearing 10/6 at noon. Motion was granted. Writer took evidence (med list) to court for processing. At this time orders have been received from the [facility] court allowing [facility] to give medication to this patient.     Jadon Dc 50

## 2022-10-06 NOTE — BH NOTE
Pt sad this pm. Her  is here and she was able to eat part of her meal with fresh juice that he brought her. She agreed to take her medication and the MD gave a verbal order to give Ativan early. He started Invega PO and she took that as well. She denies any needs.

## 2022-10-06 NOTE — PROGRESS NOTES
Sandra was isolative to her room this evening. This writer observed her eating a snack in her room this shift. Her  did not visit with her, but brought her 1 commercially bottled drink & 2 homemade drinks that are stickered & in the outpatient fridge. Sandra did not interact with staff, but when I asked how she was, she said \"thank you\". She slept a lot and refused her meds. She wanted her door closed at all times.

## 2022-10-07 LAB
A/G RATIO: 1.5 (ref 1.1–2.2)
ALBUMIN SERPL-MCNC: 4.2 G/DL (ref 3.4–5)
ALP BLD-CCNC: 60 U/L (ref 40–129)
ALT SERPL-CCNC: 78 U/L (ref 10–40)
ANION GAP SERPL CALCULATED.3IONS-SCNC: 11 MMOL/L (ref 3–16)
AST SERPL-CCNC: 61 U/L (ref 15–37)
BASOPHILS ABSOLUTE: 0.1 K/UL (ref 0–0.2)
BASOPHILS RELATIVE PERCENT: 0.9 %
BILIRUB SERPL-MCNC: 0.4 MG/DL (ref 0–1)
BUN BLDV-MCNC: 8 MG/DL (ref 7–20)
CALCIUM SERPL-MCNC: 9.8 MG/DL (ref 8.3–10.6)
CHLORIDE BLD-SCNC: 101 MMOL/L (ref 99–110)
CO2: 27 MMOL/L (ref 21–32)
CREAT SERPL-MCNC: <0.5 MG/DL (ref 0.6–1.1)
EOSINOPHILS ABSOLUTE: 0.1 K/UL (ref 0–0.6)
EOSINOPHILS RELATIVE PERCENT: 2 %
GFR AFRICAN AMERICAN: >60
GFR NON-AFRICAN AMERICAN: >60
GLUCOSE BLD-MCNC: 125 MG/DL (ref 70–99)
HCT VFR BLD CALC: 36.6 % (ref 36–48)
HEMOGLOBIN: 12.4 G/DL (ref 12–16)
LYMPHOCYTES ABSOLUTE: 2.2 K/UL (ref 1–5.1)
LYMPHOCYTES RELATIVE PERCENT: 33.6 %
MAGNESIUM: 2.1 MG/DL (ref 1.8–2.4)
MCH RBC QN AUTO: 29.6 PG (ref 26–34)
MCHC RBC AUTO-ENTMCNC: 34 G/DL (ref 31–36)
MCV RBC AUTO: 87.2 FL (ref 80–100)
MONOCYTES ABSOLUTE: 0.6 K/UL (ref 0–1.3)
MONOCYTES RELATIVE PERCENT: 9.2 %
NEUTROPHILS ABSOLUTE: 3.6 K/UL (ref 1.7–7.7)
NEUTROPHILS RELATIVE PERCENT: 54.3 %
PDW BLD-RTO: 13 % (ref 12.4–15.4)
PLATELET # BLD: 221 K/UL (ref 135–450)
PMV BLD AUTO: 8.4 FL (ref 5–10.5)
POTASSIUM REFLEX MAGNESIUM: 3.4 MMOL/L (ref 3.5–5.1)
RBC # BLD: 4.19 M/UL (ref 4–5.2)
SODIUM BLD-SCNC: 139 MMOL/L (ref 136–145)
TOTAL PROTEIN: 7 G/DL (ref 6.4–8.2)
WBC # BLD: 6.6 K/UL (ref 4–11)

## 2022-10-07 PROCEDURE — 80053 COMPREHEN METABOLIC PANEL: CPT

## 2022-10-07 PROCEDURE — 6370000000 HC RX 637 (ALT 250 FOR IP): Performed by: PSYCHIATRY & NEUROLOGY

## 2022-10-07 PROCEDURE — 36415 COLL VENOUS BLD VENIPUNCTURE: CPT

## 2022-10-07 PROCEDURE — 1240000000 HC EMOTIONAL WELLNESS R&B

## 2022-10-07 PROCEDURE — 85025 COMPLETE CBC W/AUTO DIFF WBC: CPT

## 2022-10-07 PROCEDURE — 83735 ASSAY OF MAGNESIUM: CPT

## 2022-10-07 RX ADMIN — LORAZEPAM 2 MG: 2 TABLET ORAL at 19:55

## 2022-10-07 RX ADMIN — LORAZEPAM 2 MG: 2 TABLET ORAL at 11:16

## 2022-10-07 NOTE — PROGRESS NOTES
Department of Psychiatry  AttendingProgress Note  Chief Complaint: psychosis  Ha Cole has shown improvement with regard to self care. She is eating more of her meals, and drinking . She was more engaged with me today and answered questions through the . She smiled a couple of times and when I explained the injection of Invega she shook her head as if to agree. She had taken Ativan and Invega yesterday. And Ativan this AM. Will get [place] today . Will attempt to get blood work as well. Vitals taken today . Patient's chart was reviewed and collaborated with  about the treatment plan. SUBJECTIVE:    Patient is feeling better. Suicidal ideation:  denies suicidal ideation. Patient does not have medication side effects. ROS: Patient has new complaints: no  Sleeping adequately:  Yes   Appetite adequate: No:   Attending groups: No:   Visitors:Yes    OBJECTIVE    Physical  VITALS:  BP (!) 135/92   Pulse 100   Temp 98.2 °F (36.8 °C) (Oral)   Resp 18   Ht 5' 4\" (1.626 m)   Wt 179 lb (81.2 kg)   LMP  (LMP Unknown)   SpO2 97%   BMI 30.73 kg/m²     Mental Status Examination:  Patients appearance was ill-appearing. Thoughts are Illogical. Homicidal ideations none. No abnormal movements, tics or mannerisms. Memory intact Aims 0. Concentration Fair. Alert and oriented X 4. Insight and Judgement impaired insight. Patient was cooperative.  Patient gait normal. Mood constricted, affect flat affect Hallucinations Absent, suicidal ideations no specific plan to harm self Speech soft  Data  Labs:   Admission on 09/23/2022   Component Date Value Ref Range Status    POC Glucose 09/26/2022 80  70 - 99 mg/dl Final    Performed on 09/26/2022 ACCU-CHEK   Final    WBC 09/27/2022 7.8  4.0 - 11.0 K/uL Final    RBC 09/27/2022 4.55  4.00 - 5.20 M/uL Final    Hemoglobin 09/27/2022 13.3  12.0 - 16.0 g/dL Final    Hematocrit 09/27/2022 40.0  36.0 - 48.0 % Final    MCV 09/27/2022 87.9  80.0 - 100.0 fL Final    MCH 09/27/2022 29.3  26.0 - 34.0 pg Final    MCHC 09/27/2022 33.3  31.0 - 36.0 g/dL Final    RDW 09/27/2022 13.1  12.4 - 15.4 % Final    Platelets 03/78/6489 315  135 - 450 K/uL Final    MPV 09/27/2022 7.7  5.0 - 10.5 fL Final    Neutrophils % 09/27/2022 73.1  % Final    Lymphocytes % 09/27/2022 17.4  % Final    Monocytes % 09/27/2022 6.2  % Final    Eosinophils % 09/27/2022 0.6  % Final    Basophils % 09/27/2022 2.7  % Final    Neutrophils Absolute 09/27/2022 5.7  1.7 - 7.7 K/uL Final    Lymphocytes Absolute 09/27/2022 1.4  1.0 - 5.1 K/uL Final    Monocytes Absolute 09/27/2022 0.5  0.0 - 1.3 K/uL Final    Eosinophils Absolute 09/27/2022 0.0  0.0 - 0.6 K/uL Final    Basophils Absolute 09/27/2022 0.2  0.0 - 0.2 K/uL Final    Sodium 09/27/2022 140  136 - 145 mmol/L Final    Potassium reflex Magnesium 09/27/2022 4.1  3.5 - 5.1 mmol/L Final    Chloride 09/27/2022 105  99 - 110 mmol/L Final    CO2 09/27/2022 17 (A)  21 - 32 mmol/L Final    Anion Gap 09/27/2022 18 (A)  3 - 16 Final    Glucose 09/27/2022 95  70 - 99 mg/dL Final    BUN 09/27/2022 13  7 - 20 mg/dL Final    Creatinine 09/27/2022 <0.5 (A)  0.6 - 1.1 mg/dL Final    GFR Non- 09/27/2022 >60  >60 Final    Comment: >60 mL/min/1.73m2 EGFR, calc. for ages 25 and older using the  MDRD formula (not corrected for weight), is valid for stable  renal function. GFR  09/27/2022 >60  >60 Final    Comment: Chronic Kidney Disease: less than 60 ml/min/1.73 sq.m. Kidney Failure: less than 15 ml/min/1.73 sq.m. Results valid for patients 18 years and older.       Calcium 09/27/2022 9.6  8.3 - 10.6 mg/dL Final    POC Glucose 09/30/2022 124 (A)  70 - 99 mg/dl Final    Performed on 09/30/2022 ACCU-CHEK   Final            Medications  Current Facility-Administered Medications: paliperidone palmitate ER (INVEGA SUSTENNA) IM injection 234 mg (Patient Supplied), 234 mg, IntraMUSCular, Once **FOLLOWED BY** [START ON 10/11/2022] paliperidone palmitate ER (INVEGA SUSTENNA) IM injection 156 mg (Patient Supplied), 156 mg, IntraMUSCular, Once **FOLLOWED BY** [START ON 11/1/2022] paliperidone palmitate ER (INVEGA SUSTENNA) IM injection 234 mg, 234 mg, IntraMUSCular, Q30 Days  haloperidol lactate (HALDOL) injection 10 mg, 10 mg, IntraMUSCular, Q6H PRN **AND** diazePAM (VALIUM) injection 5 mg, 5 mg, IntraMUSCular, Q6H PRN **AND** diphenhydrAMINE (BENADRYL) injection 50 mg, 50 mg, IntraMUSCular, Q6H PRN  magnesium hydroxide (MILK OF MAGNESIA) 400 MG/5ML suspension 30 mL, 30 mL, Oral, Daily PRN  nicotine polacrilex (COMMIT) lozenge 2 mg, 2 mg, Oral, Q1H PRN  aluminum & magnesium hydroxide-simethicone (MAALOX) 200-200-20 MG/5ML suspension 30 mL, 30 mL, Oral, Q6H PRN  acetaminophen (TYLENOL) tablet 650 mg, 650 mg, Oral, Q4H PRN  LORazepam (ATIVAN) tablet 2 mg, 2 mg, Oral, BID    ASSESSMENT AND PLAN    Principal Problem:    Bipolar affective disorder, current episode manic with psychotic symptoms ([facility] [address]  Active Problems:    Hypokalemia  Resolved Problems:    * No resolved hospital problems. *       1. Patient s symptoms   are improving  2. Probable discharge is next week  3. Discharge planning is incomplete  4. Suicidal ideation is  none  5. Total time with patient was 40 minutes and more than 50 % of that time was spent counseling the patient on their symptoms, treatment and expected goals.

## 2022-10-07 NOTE — PLAN OF CARE
Pt is out of her room more today. She ate breakfast in her room, but sat in the day room for lunch and supper. She does not interact with her peers or staff. But will speak with writer through the . She has denied any pain or physical abnormalities. She has taken her PO med as ordered without difficulty. She denies hearing voices or visual hallucinations. She denies SI/HI. Problem: Pain  Goal: Verbalizes/displays adequate comfort level or baseline comfort level  Outcome: Not Progressing     Problem: Safety - Adult  Goal: Free from fall injury  Outcome: Not Progressing     Problem: Skin/Tissue Integrity  Goal: Absence of new skin breakdown  Description: 1. Monitor for areas of redness and/or skin breakdown  2. Assess vascular access sites hourly  3. Every 4-6 hours minimum:  Change oxygen saturation probe site  4. Every 4-6 hours:  If on nasal continuous positive airway pressure, respiratory therapy assess nares and determine need for appliance change or resting period.   Outcome: Not Progressing

## 2022-10-07 NOTE — PROGRESS NOTES
Sandra slept most of the shift after [facility] left. [facility] brought food & drinks for her & they are labeled & in the outpatient fridge. Judith Sadie took her nighttime meds early this evening, so no medications were administered and no PRNs were requested. Sandra also allowed the tech to obtain her vitals. Very cooperative this shift.

## 2022-10-07 NOTE — PLAN OF CARE
Problem: Safety - Adult  Goal: Free from fall injury  Outcome: Progressing     Problem: Sleep Disturbance  Goal: Will exhibit normal sleeping pattern  Description: INTERVENTIONS:  1. Administer medication as ordered  2. Decrease environmental stimuli, including noise, as appropriate  3. Discourage social isolation and naps during the day  Outcome: Progressing     Problem: Nutrition Deficit:  Goal: Optimize nutritional status  Outcome: Progressing     Problem: Skin/Tissue Integrity  Goal: Absence of new skin breakdown  Description: 1. Monitor for areas of redness and/or skin breakdown  2. Assess vascular access sites hourly  3. Every 4-6 hours minimum:  Change oxygen saturation probe site  4. Every 4-6 hours:  If on nasal continuous positive airway pressure, respiratory therapy assess nares and determine need for appliance change or resting period. Outcome: Progressing     Problem: Psychosis  Goal: Will report no hallucinations or delusions  Description: INTERVENTIONS:  1. Administer medication as  ordered  2. Assist with reality testing to support increasing orientation  3.  Assess if patient's hallucinations or delusions are encouraging self harm or harm to others and intervene as appropriate  Outcome: Not Progressing

## 2022-10-07 NOTE — PROGRESS NOTES
Intake: Unable to assess (she is consuming some of the po intake that her  has provided for her on the unit)  Average Supplements Intake: Unable to assess  ADULT DIET; Regular  ADULT ORAL NUTRITION SUPPLEMENT; Breakfast, Lunch, Dinner; Low Calorie/High Protein Oral Supplement    Anthropometric Measures:  Height: 5' 4\" (162.6 cm)  Ideal Body Weight (IBW): 120 lbs (55 kg)    Admission Body Weight: 179 lb (81.2 kg) (obtained on 9/30/22; weight method not specified)  Current Body Weight: 179 lb (81.2 kg) (obtained on 9/30/22; weight method not specified), 149.2 % IBW. Weight Source: Not Specified  Current BMI (kg/m2): 30.7  Usual Body Weight:  (no weight hx in EMR)     Weight Adjustment For: No Adjustment                 BMI Categories: Obese Class 1 (BMI 30.0-34. 9)    Estimated Daily Nutrient Needs:  Energy Requirements Based On: Kcal/kg  Weight Used for Energy Requirements: Current  Energy (kcal/day): 1215 - 1458 kcals based on 15-18 kcals/kg/CBW  Weight Used for Protein Requirements: Ideal  Protein (g/day): 55 - 66 g protein based on 1.0-1.2 g/kg/IBW  Method Used for Fluid Requirements: 1 ml/kcal  Fluid (ml/day): 1215 - 1458 ml    Nutrition Diagnosis:   Inadequate oral intake related to inadequate protein-energy intake, psychological cause or life stress as evidenced by poor intake prior to admission, other (comment) (mental health compromise)    Nutrition Interventions:   Food and/or Nutrient Delivery: Continue Current Diet, Continue Oral Nutrition Supplement  Nutrition Education/Counseling: No recommendation at this time  Coordination of Nutrition Care: Continue to monitor while inpatient, Coordination of Care       Goals:  Previous Goal Met: Progressing toward Goal(s)  Goals: PO intake 50% or greater, by next RD assessment       Nutrition Monitoring and Evaluation:   Behavioral-Environmental Outcomes: None Identified  Food/Nutrient Intake Outcomes: Food and Nutrient Intake, Supplement Intake  Physical Signs/Symptoms Outcomes: Meal Time Behavior, Skin, Weight    Discharge Planning:    Continue current diet     Anton Womack, [address] [place] [address],   Contact: [phone]

## 2022-10-08 PROCEDURE — 1240000000 HC EMOTIONAL WELLNESS R&B

## 2022-10-08 PROCEDURE — 6370000000 HC RX 637 (ALT 250 FOR IP): Performed by: PSYCHIATRY & NEUROLOGY

## 2022-10-08 RX ADMIN — LORAZEPAM 2 MG: 2 TABLET ORAL at 08:31

## 2022-10-08 RX ADMIN — LORAZEPAM 2 MG: 2 TABLET ORAL at 19:52

## 2022-10-08 ASSESSMENT — PAIN SCALES - WONG BAKER: WONGBAKER_NUMERICALRESPONSE: 0

## 2022-10-08 NOTE — PROGRESS NOTES
Department of Psychiatry  AttendingProgress Note  Chief Complaint: psychosis    Patient's chart was reviewed and collaborated with  about the treatment plan. SUBJECTIVE:    Pt was up in common area. Pt made eye contact with me, smiled, and walked with me to interpretor to speak. Pt states she is feeling good, medications are ok so far. Pt reports good sleep and appetite. Mood was stable, compliant with care. Good improvements after medications and her first Invega injection yesterday. Patient is feeling better. Suicidal ideation:  denies suicidal ideation. Patient does not have medication side effects. ROS: Patient has new complaints: no  Sleeping adequately:  Yes   Appetite adequate: No:   Attending groups: No:   Visitors:Yes    OBJECTIVE    Physical  VITALS:  BP (!) 129/94   Pulse 88   Temp 98.2 °F (36.8 °C) (Infrared)   Resp 16   Ht 5' 4\" (1.626 m)   Wt 179 lb (81.2 kg)   LMP  (LMP Unknown)   SpO2 98%   BMI 30.73 kg/m²     Mental Status Examination:  Patients appearance was ill-appearing. Thoughts are Illogical. Homicidal ideations none. No abnormal movements, tics or mannerisms. Memory intact Aims 0. Concentration Fair. Alert and oriented X 4. Insight and Judgement impaired insight. Patient was cooperative.  Patient gait normal. Mood constricted, affect flat affect Hallucinations Absent, suicidal ideations no specific plan to harm self Speech soft  Data  Labs:   Admission on 09/23/2022   Component Date Value Ref Range Status    POC Glucose 09/26/2022 80  70 - 99 mg/dl Final    Performed on 09/26/2022 ACCU-CHEK   Final    WBC 09/27/2022 7.8  4.0 - 11.0 K/uL Final    RBC 09/27/2022 4.55  4.00 - 5.20 M/uL Final    Hemoglobin 09/27/2022 13.3  12.0 - 16.0 g/dL Final    Hematocrit 09/27/2022 40.0  36.0 - 48.0 % Final    MCV 09/27/2022 87.9  80.0 - 100.0 fL Final    MCH 09/27/2022 29.3  26.0 - 34.0 pg Final    MCHC 09/27/2022 33.3  31.0 - 36.0 g/dL Final    RDW 09/27/2022 13.1 12.4 - 15.4 % Final    Platelets 23/80/9149 315  135 - 450 K/uL Final    MPV 09/27/2022 7.7  5.0 - 10.5 fL Final    Neutrophils % 09/27/2022 73.1  % Final    Lymphocytes % 09/27/2022 17.4  % Final    Monocytes % 09/27/2022 6.2  % Final    Eosinophils % 09/27/2022 0.6  % Final    Basophils % 09/27/2022 2.7  % Final    Neutrophils Absolute 09/27/2022 5.7  1.7 - 7.7 K/uL Final    Lymphocytes Absolute 09/27/2022 1.4  1.0 - 5.1 K/uL Final    Monocytes Absolute 09/27/2022 0.5  0.0 - 1.3 K/uL Final    Eosinophils Absolute 09/27/2022 0.0  0.0 - 0.6 K/uL Final    Basophils Absolute 09/27/2022 0.2  0.0 - 0.2 K/uL Final    Sodium 09/27/2022 140  136 - 145 mmol/L Final    Potassium reflex Magnesium 09/27/2022 4.1  3.5 - 5.1 mmol/L Final    Chloride 09/27/2022 105  99 - 110 mmol/L Final    CO2 09/27/2022 17 (A)  21 - 32 mmol/L Final    Anion Gap 09/27/2022 18 (A)  3 - 16 Final    Glucose 09/27/2022 95  70 - 99 mg/dL Final    BUN 09/27/2022 13  7 - 20 mg/dL Final    Creatinine 09/27/2022 <0.5 (A)  0.6 - 1.1 mg/dL Final    GFR Non- 09/27/2022 >60  >60 Final    Comment: >60 mL/min/1.73m2 EGFR, calc. for ages 25 and older using the  MDRD formula (not corrected for weight), is valid for stable  renal function. GFR  09/27/2022 >60  >60 Final    Comment: Chronic Kidney Disease: less than 60 ml/min/1.73 sq.m. Kidney Failure: less than 15 ml/min/1.73 sq.m. Results valid for patients 18 years and older.       Calcium 09/27/2022 9.6  8.3 - 10.6 mg/dL Final    POC Glucose 09/30/2022 124 (A)  70 - 99 mg/dl Final    Performed on 09/30/2022 ACCU-CHEK   Final    WBC 10/07/2022 6.6  4.0 - 11.0 K/uL Final    RBC 10/07/2022 4.19  4.00 - 5.20 M/uL Final    Hemoglobin 10/07/2022 12.4  12.0 - 16.0 g/dL Final    Hematocrit 10/07/2022 36.6  36.0 - 48.0 % Final    MCV 10/07/2022 87.2  80.0 - 100.0 fL Final    MCH 10/07/2022 29.6  26.0 - 34.0 pg Final    MCHC 10/07/2022 34.0  31.0 - 36.0 g/dL Final    RDW 10/07/2022 13.0  12.4 - 15.4 % Final    Platelets 86/51/2894 221  135 - 450 K/uL Final    MPV 10/07/2022 8.4  5.0 - 10.5 fL Final    Neutrophils % 10/07/2022 54.3  % Final    Lymphocytes % 10/07/2022 33.6  % Final    Monocytes % 10/07/2022 9.2  % Final    Eosinophils % 10/07/2022 2.0  % Final    Basophils % 10/07/2022 0.9  % Final    Neutrophils Absolute 10/07/2022 3.6  1.7 - 7.7 K/uL Final    Lymphocytes Absolute 10/07/2022 2.2  1.0 - 5.1 K/uL Final    Monocytes Absolute 10/07/2022 0.6  0.0 - 1.3 K/uL Final    Eosinophils Absolute 10/07/2022 0.1  0.0 - 0.6 K/uL Final    Basophils Absolute 10/07/2022 0.1  0.0 - 0.2 K/uL Final    Sodium 10/07/2022 139  136 - 145 mmol/L Final    Potassium reflex Magnesium 10/07/2022 3.4 (A)  3.5 - 5.1 mmol/L Final    Chloride 10/07/2022 101  99 - 110 mmol/L Final    CO2 10/07/2022 27  21 - 32 mmol/L Final    Anion Gap 10/07/2022 11  3 - 16 Final    Glucose 10/07/2022 125 (A)  70 - 99 mg/dL Final    BUN 10/07/2022 8  7 - 20 mg/dL Final    Creatinine 10/07/2022 <0.5 (A)  0.6 - 1.1 mg/dL Final    GFR Non- 10/07/2022 >60  >60 Final    Comment: >60 mL/min/1.73m2 EGFR, calc. for ages 25 and older using the  MDRD formula (not corrected for weight), is valid for stable  renal function. GFR  10/07/2022 >60  >60 Final    Comment: Chronic Kidney Disease: less than 60 ml/min/1.73 sq.m. Kidney Failure: less than 15 ml/min/1.73 sq.m. Results valid for patients 18 years and older.       Calcium 10/07/2022 9.8  8.3 - 10.6 mg/dL Final    Total Protein 10/07/2022 7.0  6.4 - 8.2 g/dL Final    Albumin 10/07/2022 4.2  3.4 - 5.0 g/dL Final    Albumin/Globulin Ratio 10/07/2022 1.5  1.1 - 2.2 Final    Total Bilirubin 10/07/2022 0.4  0.0 - 1.0 mg/dL Final    Alkaline Phosphatase 10/07/2022 60  40 - 129 U/L Final    ALT 10/07/2022 78 (A)  10 - 40 U/L Final    AST 10/07/2022 61 (A)  15 - 37 U/L Final    Magnesium 10/07/2022 2.10  1.80 - 2.40 mg/dL Final Medications  Current Facility-Administered Medications: [COMPLETED] paliperidone palmitate ER (INVEGA SUSTENNA) IM injection 234 mg (Patient Supplied), 234 mg, IntraMUSCular, Once **FOLLOWED BY** [START ON 10/11/2022] paliperidone palmitate ER (INVEGA SUSTENNA) IM injection 156 mg (Patient Supplied), 156 mg, IntraMUSCular, Once **FOLLOWED BY** [START ON 11/1/2022] paliperidone palmitate ER (INVEGA SUSTENNA) IM injection 234 mg, 234 mg, IntraMUSCular, Q30 Days  haloperidol lactate (HALDOL) injection 10 mg, 10 mg, IntraMUSCular, Q6H PRN **AND** diazePAM (VALIUM) injection 5 mg, 5 mg, IntraMUSCular, Q6H PRN **AND** diphenhydrAMINE (BENADRYL) injection 50 mg, 50 mg, IntraMUSCular, Q6H PRN  magnesium hydroxide (MILK OF MAGNESIA) 400 MG/5ML suspension 30 mL, 30 mL, Oral, Daily PRN  nicotine polacrilex (COMMIT) lozenge 2 mg, 2 mg, Oral, Q1H PRN  aluminum & magnesium hydroxide-simethicone (MAALOX) 200-200-20 MG/5ML suspension 30 mL, 30 mL, Oral, Q6H PRN  acetaminophen (TYLENOL) tablet 650 mg, 650 mg, Oral, Q4H PRN  LORazepam (ATIVAN) tablet 2 mg, 2 mg, Oral, BID    ASSESSMENT AND PLAN    Principal Problem:    Bipolar affective disorder, current episode manic with psychotic symptoms ([address]  Active Problems:    Hypokalemia  Resolved Problems:    * No resolved hospital problems. *       1. Patient s symptoms   are improving  2. Probable discharge is next week  3. Discharge planning is incomplete  4. Suicidal ideation is  none  5. Total time with patient was 40 minutes and more than 50 % of that time was spent counseling the patient on their symptoms, treatment and expected goals.       Aaliyah Brown, [id number]

## 2022-10-08 NOTE — PLAN OF CARE
Problem: Safety - Adult  Goal: Free from fall injury  10/7/2022 2249 by Hari Parra RN  Outcome: Progressing  10/7/2022 1801 by Clarisa Bravo RN  Outcome: Not Progressing     Problem: Psychosis  Goal: Will report no hallucinations or delusions  Description: INTERVENTIONS:  1. Administer medication as  ordered  2. Assist with reality testing to support increasing orientation  3. Assess if patient's hallucinations or delusions are encouraging self harm or harm to others and intervene as appropriate  10/7/2022 2249 by Hari Parra RN  Outcome: Progressing  10/7/2022 1801 by Clarisa Bravo RN  Outcome: Progressing     Problem: Sleep Disturbance  Goal: Will exhibit normal sleeping pattern  Description: INTERVENTIONS:  1. Administer medication as ordered  2. Decrease environmental stimuli, including noise, as appropriate  3. Discourage social isolation and naps during the day  10/7/2022 2249 by Hari Parra RN  Outcome: Progressing  10/7/2022 1801 by Clarisa Bravo RN  Outcome: Progressing     Problem: Nutrition Deficit:  Goal: Optimize nutritional status  10/7/2022 2249 by Hari Parra RN  Outcome: Progressing  10/7/2022 1801 by Clarisa Bravo RN  Outcome: Progressing  Flowsheets (Taken 10/7/2022 1316 by Osiris Shafer, RD, LD)  Nutrient intake appropriate for improving, restoring, or maintaining nutritional needs:   Assess nutritional status and recommend course of action   Monitor oral intake, labs, and treatment plans   Recommend appropriate diets, oral nutritional supplements, and vitamin/mineral supplements     Problem: Safety - Adult  Goal: Free from fall injury  10/7/2022 2249 by Hari Parra RN  Outcome: Progressing  10/7/2022 1801 by Clarisa Bravo RN  Outcome: Not Progressing      Pt was visible on unit this evening, sitting at table in dayroom coloring. Calm, cooperative, compliant with evening medication. With , Rosmery Asencio was able to interview pt and she CFS and denies all.  Still appears suspicious or paranoid at times of others, but improved.

## 2022-10-08 NOTE — PROGRESS NOTES
Behavioral Services                                              Medicare Re-Certification    I certify that the inpatient psychiatric hospital services furnished since the previous certification/re-certification were, and continue to be, medically necessary for;    [x] (1) Treatment which could reasonably be expected to improve the patient's condition,    [x] (2) Or for diagnostic study. Estimated length of stay/service 2-5 days    Plan for post-hospital care outpatient services    This patient continues to need, on a daily basis, active treatment furnished directly by or requiring the supervision of inpatient psychiatric personnel.     Electronically signed by JULIO C Porras CNP on 10/8/2022 at 6:41 PM

## 2022-10-09 PROCEDURE — 1240000000 HC EMOTIONAL WELLNESS R&B

## 2022-10-09 PROCEDURE — 6370000000 HC RX 637 (ALT 250 FOR IP): Performed by: PSYCHIATRY & NEUROLOGY

## 2022-10-09 RX ADMIN — LORAZEPAM 2 MG: 2 TABLET ORAL at 08:26

## 2022-10-09 RX ADMIN — LORAZEPAM 2 MG: 2 TABLET ORAL at 20:31

## 2022-10-09 NOTE — GROUP NOTE
Group Therapy Note    Date: 10/9/2022    Group Start Time: 1000  Group End Time: 1050  Group Topic: Education [address], MSW        Group Therapy Note    Attendees: 8    Group members participated in activity to identify 10 things that each member of the group has in common. This activity was designed to build connection and promote group bonding. Following this activity, group members were led in a discussion about 15 common cognitive distortions and how these distortions can lead to and fuel Automatic Negative Thoughts. Patient's Goal:      Notes:  PT was alert and actively listened to the first 35min of group with the help of an interpretor. Status After Intervention:  Improved    Participation Level:  Active Listener    Participation Quality: Appropriate and Attentive      Speech:  mute      Thought Process/Content: Logical      Affective Functioning: Congruent      Mood: euthymic      Level of consciousness:  Alert, Oriented x4, and Attentive      Response to Learning: Able to change behavior      Endings: None Reported    Modes of Intervention: Education, Support, Exploration, Clarifying, and Reality-testing      Discipline Responsible: /Counselor      Signature:  BRII Amato

## 2022-10-09 NOTE — GROUP NOTE
Group Therapy Note    Date: 10/9/2022    Group Start Time: 1300  Group End Time: 5467  Group Topic: Activity    [address]        Group Therapy Note    Attendees: 10    Group members participated in a game of Jeopardy to build teamwork skills, build positive socialization skills, and stimulate critical thinking skills. Patient's Goal:      Notes:  PT was alert, was not interactive, and was present for most of the group, leaving toward the middle for 5min and returning for 10 additional min. Status After Intervention:  Unchanged    Participation Level:  Active Listener    Participation Quality: Appropriate and Attentive      Speech:  mute      Thought Process/Content: DEVIN      Affective Functioning: Flat and Constricted/Restricted      Mood: euthymic      Level of consciousness:  Alert and Attentive      Response to Learning: Able to change behavior      Endings: None Reported    Modes of Intervention: Education, Socialization, and Activity      Discipline Responsible: /Counselor      Signature:  BRII Bishop

## 2022-10-09 NOTE — PLAN OF CARE
Problem: Pain  Goal: Verbalizes/displays adequate comfort level or baseline comfort level  10/8/2022 2126 by Loi Dixon RN  Outcome: Progressing     Problem: Safety - Adult  Goal: Free from fall injury  10/8/2022 2126 by Loi Dixon RN  Outcome: Ana M Ruiz has been mostly isolative to her room this shift. Patient denies current SI/HI, denies A/V/H. Patient denies any pain at this time. Pleasant on approach with good eye contact and smiling during interaction. Medication compliant. Did not attend group.

## 2022-10-09 NOTE — PLAN OF CARE
Mariela Cool has been up most of the morning. She is pleasant and cooperative, but has not been as bright and smiling. She took her am medication and ate in the day room. She attended most of the am group with Mark Guardado interpreting for her. She eventually left and went to bed. She appears paranoid at times, peeking out her room door before coming out. She was able to participate in her assessment this am. She malu SI/HI. She has not been noted to be RTIS, and denies hallucinations. She declined a shower today.

## 2022-10-09 NOTE — BH NOTE
Sandra participated in 2 groups today wit the . She is in the wrap up group now, but got up and moved when the  sat down. She has been flat today except for when she was on the phone with her , she smiled.

## 2022-10-10 PROCEDURE — 1240000000 HC EMOTIONAL WELLNESS R&B

## 2022-10-10 PROCEDURE — 6370000000 HC RX 637 (ALT 250 FOR IP): Performed by: PSYCHIATRY & NEUROLOGY

## 2022-10-10 RX ADMIN — LORAZEPAM 2 MG: 2 TABLET ORAL at 21:37

## 2022-10-10 RX ADMIN — LORAZEPAM 2 MG: 2 TABLET ORAL at 10:08

## 2022-10-10 NOTE — PROGRESS NOTES
Pt noted to be wandering from room to table in day room. She is alert to self but DEVIN place, time and situation d/t pt limited interaction. She did take a orange from writer to eat. Pt shakes head \" no when ask if she was anxious and again when ask if she was sad lastly pt shook head no when ask if she wanted to hurt self. After these three questions patient starts to walk away and will not engage anymore. No s/s of distress noted. No s/s of RTIS noted. +meds. Declined drink that  had brought in at this time.

## 2022-10-10 NOTE — PLAN OF CARE
Problem: Pain  Goal: Verbalizes/displays adequate comfort level or baseline comfort level  Outcome: Progressing     Problem: Safety - Adult  Goal: Free from fall injury  Outcome: Progressing      10/10/22 1811   Mental Status and Behavioral Exam   Normal No   Level of Assistance Independent/Self   Facial Expression Flat   Affect Blunt   Level of Consciousness Alert   Frequency of Checks 4 times per hour, close   Mood:Normal No   Motor Activity:Normal Yes   Motor Activity Decreased   Eye Contact Fair   Sexual Misconduct History Current - no   Preception Burna to person;Burna to time   Attention:Normal No   Thought Processes Circumstantial   Thought Content:Normal No   Thought Content Poverty of content   Depression Symptoms Isolative   Anxiety Symptoms No problems reported or observed. Rhonda Symptoms No problems reported or observed. Hallucinations None   Memory:Normal No   Memory Poor recent   Insight and Judgment No   Insight and Judgment Poor judgment;Poor insight   Patient has been up and visible on unit but withdrawn to self. Patient has minimal interaction even with interpretor present. Patient has been medication compliant. Patient denies SI/HI and AVH.

## 2022-10-10 NOTE — PROGRESS NOTES
Onset: 2130  Location / description: Person answering the phone stated \"let me see if she is still conscious\". When asking to speak with patient   Patient stated that she wanted to go to the ER, but was told she is not able to go to the hospital. Jay hands, feet, legs and lips tingling. R arm feels weak. Patient is having chest pain, SOB, visual changes, neck pain.  Per patient all symptoms come and go, they get worse when laying down. Patient stated that she finally fell asleep and woke up dizzy. Patient stated that she passed a blood clot the size of a quarter.     While triaging patient's symptom patient stated again that she wanted to go to the hospital.     When discussed that the patient always has the option of presenting to the ER for eval if concerned, Patient stated that PCP and ER stated that \"ti was all in her head\". When asking for more information about symptoms. Patient stated that \"it sounds like you talked to the ER tonight, did you talk to the ER tonight?\" Writer informed patient that no, have not spoken to ER tonight about her, attempting to understand what symptoms she was experiencing. Patient then stated to other person present \"they don't believe me, what ever Dr Flood has in my chart, they don't believe me.     Patient hung up    Reason for Disposition  • Neck pain is the main symptom (and having weakness, numbness, or tingling in arm because of neck pain)    Protocols used: NEUROLOGIC DEFICIT-A-AH       Department of Psychiatry  AttendingProgress Note  Chief Complaint: psychosis  Andreia Elizabeth has shown marked improvement from last week. More engaged and cooperative. Received first Arjun Julio last week without side effects. She smiled at me today and responded to all questions asked. Had a call with mother today. Eating meals on the unit. Will get second [place] tomorrow. Patient's chart was reviewed and collaborated with  about the treatment plan. SUBJECTIVE:    Patient is feeling better. Suicidal ideation:  denies suicidal ideation. Patient does not have medication side effects. ROS: Patient has new complaints: no  Sleeping adequately:  Yes   Appetite adequate: Yes  Attending groups: Yes  Visitors:Yes    OBJECTIVE    Physical  VITALS:  BP (!) 132/99   Pulse 100   Temp 97.2 °F (36.2 °C) (Oral)   Resp 18   Ht 5' 4\" (1.626 m)   Wt 179 lb (81.2 kg)   LMP  (LMP Unknown)   SpO2 100%   BMI 30.73 kg/m²     Mental Status Examination:  Patients appearance was ill-appearing. Thoughts are Paucity of Ideas. Homicidal ideations none. No abnormal movements, tics or mannerisms. Memory intact Aims 0. Concentration Fair. Alert and oriented X 4. Insight and Judgement impaired insight. Patient was cooperative.  Patient gait normal. Mood constricted, affect flat affect Hallucinations Absent, suicidal ideations no specific plan to harm self Speech soft  Data  Labs:   Admission on 09/23/2022   Component Date Value Ref Range Status    POC Glucose 09/26/2022 80  70 - 99 mg/dl Final    Performed on 09/26/2022 ACCU-CHEK   Final    WBC 09/27/2022 7.8  4.0 - 11.0 K/uL Final    RBC 09/27/2022 4.55  4.00 - 5.20 M/uL Final    Hemoglobin 09/27/2022 13.3  12.0 - 16.0 g/dL Final    Hematocrit 09/27/2022 40.0  36.0 - 48.0 % Final    MCV 09/27/2022 87.9  80.0 - 100.0 fL Final    MCH 09/27/2022 29.3  26.0 - 34.0 pg Final    MCHC 09/27/2022 33.3  31.0 - 36.0 g/dL Final    RDW 09/27/2022 13.1  12.4 - 15.4 % Final Platelets 98/79/7775 315  135 - 450 K/uL Final    MPV 09/27/2022 7.7  5.0 - 10.5 fL Final    Neutrophils % 09/27/2022 73.1  % Final    Lymphocytes % 09/27/2022 17.4  % Final    Monocytes % 09/27/2022 6.2  % Final    Eosinophils % 09/27/2022 0.6  % Final    Basophils % 09/27/2022 2.7  % Final    Neutrophils Absolute 09/27/2022 5.7  1.7 - 7.7 K/uL Final    Lymphocytes Absolute 09/27/2022 1.4  1.0 - 5.1 K/uL Final    Monocytes Absolute 09/27/2022 0.5  0.0 - 1.3 K/uL Final    Eosinophils Absolute 09/27/2022 0.0  0.0 - 0.6 K/uL Final    Basophils Absolute 09/27/2022 0.2  0.0 - 0.2 K/uL Final    Sodium 09/27/2022 140  136 - 145 mmol/L Final    Potassium reflex Magnesium 09/27/2022 4.1  3.5 - 5.1 mmol/L Final    Chloride 09/27/2022 105  99 - 110 mmol/L Final    CO2 09/27/2022 17 (A)  21 - 32 mmol/L Final    Anion Gap 09/27/2022 18 (A)  3 - 16 Final    Glucose 09/27/2022 95  70 - 99 mg/dL Final    BUN 09/27/2022 13  7 - 20 mg/dL Final    Creatinine 09/27/2022 <0.5 (A)  0.6 - 1.1 mg/dL Final    GFR Non- 09/27/2022 >60  >60 Final    Comment: >60 mL/min/1.73m2 EGFR, calc. for ages 25 and older using the  MDRD formula (not corrected for weight), is valid for stable  renal function. GFR  09/27/2022 >60  >60 Final    Comment: Chronic Kidney Disease: less than 60 ml/min/1.73 sq.m. Kidney Failure: less than 15 ml/min/1.73 sq.m. Results valid for patients 18 years and older.       Calcium 09/27/2022 9.6  8.3 - 10.6 mg/dL Final    POC Glucose 09/30/2022 124 (A)  70 - 99 mg/dl Final    Performed on 09/30/2022 ACCU-CHEK   Final    WBC 10/07/2022 6.6  4.0 - 11.0 K/uL Final    RBC 10/07/2022 4.19  4.00 - 5.20 M/uL Final    Hemoglobin 10/07/2022 12.4  12.0 - 16.0 g/dL Final    Hematocrit 10/07/2022 36.6  36.0 - 48.0 % Final    MCV 10/07/2022 87.2  80.0 - 100.0 fL Final    MCH 10/07/2022 29.6  26.0 - 34.0 pg Final    MCHC 10/07/2022 34.0  31.0 - 36.0 g/dL Final    RDW 10/07/2022 13.0  12.4 - 15.4 % Final    Platelets 51/81/2359 221  135 - 450 K/uL Final    MPV 10/07/2022 8.4  5.0 - 10.5 fL Final    Neutrophils % 10/07/2022 54.3  % Final    Lymphocytes % 10/07/2022 33.6  % Final    Monocytes % 10/07/2022 9.2  % Final    Eosinophils % 10/07/2022 2.0  % Final    Basophils % 10/07/2022 0.9  % Final    Neutrophils Absolute 10/07/2022 3.6  1.7 - 7.7 K/uL Final    Lymphocytes Absolute 10/07/2022 2.2  1.0 - 5.1 K/uL Final    Monocytes Absolute 10/07/2022 0.6  0.0 - 1.3 K/uL Final    Eosinophils Absolute 10/07/2022 0.1  0.0 - 0.6 K/uL Final    Basophils Absolute 10/07/2022 0.1  0.0 - 0.2 K/uL Final    Sodium 10/07/2022 139  136 - 145 mmol/L Final    Potassium reflex Magnesium 10/07/2022 3.4 (A)  3.5 - 5.1 mmol/L Final    Chloride 10/07/2022 101  99 - 110 mmol/L Final    CO2 10/07/2022 27  21 - 32 mmol/L Final    Anion Gap 10/07/2022 11  3 - 16 Final    Glucose 10/07/2022 125 (A)  70 - 99 mg/dL Final    BUN 10/07/2022 8  7 - 20 mg/dL Final    Creatinine 10/07/2022 <0.5 (A)  0.6 - 1.1 mg/dL Final    GFR Non- 10/07/2022 >60  >60 Final    Comment: >60 mL/min/1.73m2 EGFR, calc. for ages 25 and older using the  MDRD formula (not corrected for weight), is valid for stable  renal function. GFR  10/07/2022 >60  >60 Final    Comment: Chronic Kidney Disease: less than 60 ml/min/1.73 sq.m. Kidney Failure: less than 15 ml/min/1.73 sq.m. Results valid for patients 18 years and older.       Calcium 10/07/2022 9.8  8.3 - 10.6 mg/dL Final    Total Protein 10/07/2022 7.0  6.4 - 8.2 g/dL Final    Albumin 10/07/2022 4.2  3.4 - 5.0 g/dL Final    Albumin/Globulin Ratio 10/07/2022 1.5  1.1 - 2.2 Final    Total Bilirubin 10/07/2022 0.4  0.0 - 1.0 mg/dL Final    Alkaline Phosphatase 10/07/2022 60  40 - 129 U/L Final    ALT 10/07/2022 78 (A)  10 - 40 U/L Final    AST 10/07/2022 61 (A)  15 - 37 U/L Final    Magnesium 10/07/2022 2.10  1.80 - 2.40 mg/dL Final            Medications  Current Facility-Administered Medications: [COMPLETED] paliperidone palmitate ER (INVEGA SUSTENNA) IM injection 234 mg (Patient Supplied), 234 mg, IntraMUSCular, Once **FOLLOWED BY** [START ON 10/11/2022] paliperidone palmitate ER (INVEGA SUSTENNA) IM injection 156 mg (Patient Supplied), 156 mg, IntraMUSCular, Once **FOLLOWED BY** [START ON 11/1/2022] paliperidone palmitate ER (INVEGA SUSTENNA) IM injection 234 mg, 234 mg, IntraMUSCular, Q30 Days  haloperidol lactate (HALDOL) injection 10 mg, 10 mg, IntraMUSCular, Q6H PRN **AND** diazePAM (VALIUM) injection 5 mg, 5 mg, IntraMUSCular, Q6H PRN **AND** diphenhydrAMINE (BENADRYL) injection 50 mg, 50 mg, IntraMUSCular, Q6H PRN  magnesium hydroxide (MILK OF MAGNESIA) 400 MG/5ML suspension 30 mL, 30 mL, Oral, Daily PRN  nicotine polacrilex (COMMIT) lozenge 2 mg, 2 mg, Oral, Q1H PRN  aluminum & magnesium hydroxide-simethicone (MAALOX) 200-200-20 MG/5ML suspension 30 mL, 30 mL, Oral, Q6H PRN  acetaminophen (TYLENOL) tablet 650 mg, 650 mg, Oral, Q4H PRN  LORazepam (ATIVAN) tablet 2 mg, 2 mg, Oral, BID    ASSESSMENT AND PLAN    Principal Problem:    Bipolar affective disorder, current episode manic with psychotic symptoms ([address]  Active Problems:    Hypokalemia  Resolved Problems:    * No resolved hospital problems. *       1. Patient s symptoms   are improving  2. Probable discharge is 3-4 d  3. Discharge planning is complete  4. Suicidal ideation is  none  5. Total time with patient was 40 minutes and more than 50 % of that time was spent counseling the patient on their symptoms, treatment and expected goals.

## 2022-10-10 NOTE — PLAN OF CARE
Problem: Psychosis  Goal: Will report no hallucinations or delusions  Description: INTERVENTIONS:  1. Administer medication as  ordered  2. Assist with reality testing to support increasing orientation  3. Assess if patient's hallucinations or delusions are encouraging self harm or harm to others and intervene as appropriate  10/9/2022 2201 by Jason Ewing RN  Outcome: Progressing  10/9/2022 1120 by Ainsley Campos RN  Outcome: Progressing     Problem: Sleep Disturbance  Goal: Will exhibit normal sleeping pattern  Description: INTERVENTIONS:  1. Administer medication as ordered  2. Decrease environmental stimuli, including noise, as appropriate  3. Discourage social isolation and naps during the day  10/9/2022 2201 by Jason Ewing RN  Outcome: Progressing  10/9/2022 1120 by Ainsley Campos RN  Outcome: Progressing     Problem: Skin/Tissue Integrity  Goal: Absence of new skin breakdown  Description: 1. Monitor for areas of redness and/or skin breakdown  2. Assess vascular access sites hourly  3. Every 4-6 hours minimum:  Change oxygen saturation probe site  4. Every 4-6 hours:  If on nasal continuous positive airway pressure, respiratory therapy assess nares and determine need for appliance change or resting period.   10/9/2022 2201 by Jsaon Ewing RN  Outcome: Progressing  10/9/2022 1120 by Ainsley Campos RN  Outcome: Progressing

## 2022-10-11 PROCEDURE — 1240000000 HC EMOTIONAL WELLNESS R&B

## 2022-10-11 PROCEDURE — 6370000000 HC RX 637 (ALT 250 FOR IP)

## 2022-10-11 PROCEDURE — 6370000000 HC RX 637 (ALT 250 FOR IP): Performed by: PSYCHIATRY & NEUROLOGY

## 2022-10-11 RX ORDER — TRAZODONE HYDROCHLORIDE 50 MG/1
50 TABLET ORAL NIGHTLY PRN
Status: DISCONTINUED | OUTPATIENT
Start: 2022-10-11 | End: 2022-10-14 | Stop reason: HOSPADM

## 2022-10-11 RX ADMIN — TRAZODONE HYDROCHLORIDE 50 MG: 50 TABLET ORAL at 01:27

## 2022-10-11 RX ADMIN — LORAZEPAM 2 MG: 2 TABLET ORAL at 08:44

## 2022-10-11 RX ADMIN — LORAZEPAM 2 MG: 2 TABLET ORAL at 22:43

## 2022-10-11 NOTE — GROUP NOTE
Group Therapy Note    Date: 10/11/2022    Group Start Time: 1600  Group End Time: 1700  Group Topic: Healthy Living/Wellness    [facility]    Irlanda Gusman RN        Group Note Attendees:       Patient's Goal:  social interaction and communication     Notes:  wandered around the group, but was not interactive    Status After Intervention:  Improved    Participation Level: Minimal    Participation Quality: Supportive      Speech:  Latvian speaking did not interact with staff      Thought Process/Content: Delusional  Hallucinating      Affective Functioning: Flat      Mood: anxious      Level of consciousness:  Preoccupied      Response to Learning: Progressing to goal      Endings: None Reported    Modes of Intervention: Activity      Discipline Responsible: Registered Nurse      Signature:  Irlanda Gusman RN

## 2022-10-11 NOTE — PLAN OF CARE
Problem: Pain  Goal: Verbalizes/displays adequate comfort level or baseline comfort level  10/11/2022 1554 by Laurie Coe RN  Outcome: Not Progressing  10/11/2022 0710 by Chey Webb RN  Outcome: Progressing     Problem: Safety - Adult  Goal: Free from fall injury  10/11/2022 1554 by Laurie Coe RN  Outcome: Not Progressing  10/11/2022 0710 by Chey Webb RN  Outcome: Progressing     Problem: Psychosis  Goal: Will report no hallucinations or delusions  Description: INTERVENTIONS:  1. Administer medication as  ordered  2. Assist with reality testing to support increasing orientation  3. Assess if patient's hallucinations or delusions are encouraging self harm or harm to others and intervene as appropriate  10/11/2022 1554 by Laurie Coe RN  Outcome: Not Progressing  10/11/2022 0710 by Chey Webb RN  Outcome: Progressing     Problem: Sleep Disturbance  Goal: Will exhibit normal sleeping pattern  Description: INTERVENTIONS:  1. Administer medication as ordered  2. Decrease environmental stimuli, including noise, as appropriate  3. Discourage social isolation and naps during the day  10/11/2022 1554 by Laurie Coe RN  Outcome: Not Progressing  10/11/2022 0710 by Chey Webb RN  Outcome: Not Progressing     Problem: Nutrition Deficit:  Goal: Optimize nutritional status  Outcome: Not Progressing  Flowsheets (Taken 10/11/2022 1330 by Regan Carcamo, RD, LD)  Nutrient intake appropriate for improving, restoring, or maintaining nutritional needs:   Assess nutritional status and recommend course of action   Monitor oral intake, labs, and treatment plans   Recommend appropriate diets, oral nutritional supplements, and vitamin/mineral supplements     Problem: Skin/Tissue Integrity  Goal: Absence of new skin breakdown  Description: 1. Monitor for areas of redness and/or skin breakdown  2. Assess vascular access sites hourly  3.   Every 4-6 hours minimum:  Change oxygen saturation probe site  4. Every 4-6 hours:  If on nasal continuous positive airway pressure, respiratory therapy assess nares and determine need for appliance change or resting period.   Outcome: Not Progressing

## 2022-10-11 NOTE — PROGRESS NOTES
Comprehensive Nutrition Assessment    Type and Reason for Visit:  Reassess    Nutrition Recommendations/Plan:   Continue ADULT DIET; Regular diet order - please document meal intake (meals provided by dietary or food provided by her ) % in flow sheets for each meal.   Continue Ensure high-protein with meals - please document ONS intake % in flow sheets. Monitor appetite, meal intake, and acceptance/intake of ONS. Please obtain an actual, current weight for this patient - last weight was obtained on 9/30/22 and unknown whether this weight was actual or stated. Monitor mental status, bowel function, and weight trends. Malnutrition Assessment:  Malnutrition Status: At risk for malnutrition (10/11/22 1411)    Context:  Acute Illness     Findings of the 6 clinical characteristics of malnutrition:  Energy Intake:  50% or less of estimated energy requirements for 5 or more days  Weight Loss:  Unable to assess     Body Fat Loss:  Unable to assess     Muscle Mass Loss:  Unable to assess    Fluid Accumulation:  No significant fluid accumulation     Strength:  Not Performed    Nutrition Assessment:    patient continues to improve from a nutritional standpoint AEB appetite and po intake are improved + mental status is improving, however, she remains at risk for further compromise d/t inadequate nutrition intake for majority of the admission and mental status compromise; will continue ADULT DIET;  Regular diet order and Ensure high-protein with meals    Nutrition Related Findings:    patient is A & O; patient's mental status has improved; her appetite and po intake has improved since her  has brought in foods/drinks that she likes; she has been participating in groups and more engaged on the unit Wound Type: None       Current Nutrition Intake & Therapies:    Average Meal Intake: Unable to assess (no po intake data documented in flow sheets but patient is consuming po nutrition, per notes)  Average Supplements Intake: Unable to assess (no ONS intake data documented in flow sheets)  ADULT DIET; Regular  ADULT ORAL NUTRITION SUPPLEMENT; Breakfast, Lunch, Dinner; Low Calorie/High Protein Oral Supplement    Anthropometric Measures:  Height: 5' 4\" (162.6 cm)  Ideal Body Weight (IBW): 120 lbs (55 kg)    Admission Body Weight: 179 lb (81.2 kg) (obtained on 9/30/22; weight method not specified)  Current Body Weight: 179 lb (81.2 kg) (obtained on 9/30/22; weight method not specified), 149.2 % IBW. Weight Source: Not Specified  Current BMI (kg/m2): 30.7  Usual Body Weight:  (no weight hx in EMR)     Weight Adjustment For: No Adjustment                 BMI Categories: Obese Class 1 (BMI 30.0-34. 9)    Estimated Daily Nutrient Needs:  Energy Requirements Based On: Kcal/kg  Weight Used for Energy Requirements: Current  Energy (kcal/day): 1215 - 1458 kcals based on 15-18 kcals/kg/CBW  Weight Used for Protein Requirements: Ideal  Protein (g/day): 55 - 66 g protein based on 1.0-1.2 g/kg/IBW  Method Used for Fluid Requirements: 1 ml/kcal  Fluid (ml/day): 1215 - 1458 ml    Nutrition Diagnosis:   Inadequate oral intake related to inadequate protein-energy intake, psychological cause or life stress as evidenced by poor intake prior to admission, other (comment) (mental health compromise)    Nutrition Interventions:   Food and/or Nutrient Delivery: Continue Current Diet, Continue Oral Nutrition Supplement  Nutrition Education/Counseling: No recommendation at this time  Coordination of Nutrition Care: Continue to monitor while inpatient, Coordination of Care       Goals:  Previous Goal Met: Progressing toward Goal(s)  Goals: PO intake 75% or greater, by next RD assessment       Nutrition Monitoring and Evaluation:   Behavioral-Environmental Outcomes: None Identified  Food/Nutrient Intake Outcomes: Food and Nutrient Intake, Supplement Intake  Physical Signs/Symptoms Outcomes: Meal Time Behavior, Skin, Weight    Discharge Planning: Continue current diet     [facility], [address] [place] [address], LD  Contact: [phone]

## 2022-10-11 NOTE — PROGRESS NOTES
Department of Psychiatry  AttendingProgress Note  Chief Complaint: psychosis  Rani Hernandez is showing gradual improvement. She walks the unit and has a constricted affect. She offered me baked goods from home today. Answered questions in a soft voice. Received the second Marvelyn Perch today . Will hopefully dc by end of week  Patient's chart was reviewed and collaborated with  about the treatment plan. SUBJECTIVE:    Patient is feeling better. Suicidal ideation:  denies suicidal ideation. Patient does not have medication side effects. ROS: Patient has new complaints: no  Sleeping adequately:  Yes   Appetite adequate: Yes  Attending groups: No:   Visitors:Yes    OBJECTIVE    Physical  VITALS:  BP (!) 132/99   Pulse 100   Temp 97.2 °F (36.2 °C) (Oral)   Resp 18   Ht 5' 4\" (1.626 m)   Wt 179 lb (81.2 kg)   LMP  (LMP Unknown)   SpO2 100%   BMI 30.73 kg/m²     Mental Status Examination:  Patients appearance was ill-appearing. Thoughts are Paucity of Ideas. Homicidal ideations none. No abnormal movements, tics or mannerisms. Memory intact Aims 0. Concentration Good. Alert and oriented X 4. Insight and Judgement impaired insight. Patient was cooperative.  Patient gait normal. Mood constricted, affect flat affect Hallucinations Absent, suicidal ideations no specific plan to harm self Speech soft  Data  Labs:   Admission on 09/23/2022   Component Date Value Ref Range Status    POC Glucose 09/26/2022 80  70 - 99 mg/dl Final    Performed on 09/26/2022 ACCU-CHEK   Final    WBC 09/27/2022 7.8  4.0 - 11.0 K/uL Final    RBC 09/27/2022 4.55  4.00 - 5.20 M/uL Final    Hemoglobin 09/27/2022 13.3  12.0 - 16.0 g/dL Final    Hematocrit 09/27/2022 40.0  36.0 - 48.0 % Final    MCV 09/27/2022 87.9  80.0 - 100.0 fL Final    MCH 09/27/2022 29.3  26.0 - 34.0 pg Final    MCHC 09/27/2022 33.3  31.0 - 36.0 g/dL Final    RDW 09/27/2022 13.1  12.4 - 15.4 % Final    Platelets 53/81/9986 315  135 - 450 K/uL Final    MPV 09/27/2022 7.7  5.0 - 10.5 fL Final    Neutrophils % 09/27/2022 73.1  % Final    Lymphocytes % 09/27/2022 17.4  % Final    Monocytes % 09/27/2022 6.2  % Final    Eosinophils % 09/27/2022 0.6  % Final    Basophils % 09/27/2022 2.7  % Final    Neutrophils Absolute 09/27/2022 5.7  1.7 - 7.7 K/uL Final    Lymphocytes Absolute 09/27/2022 1.4  1.0 - 5.1 K/uL Final    Monocytes Absolute 09/27/2022 0.5  0.0 - 1.3 K/uL Final    Eosinophils Absolute 09/27/2022 0.0  0.0 - 0.6 K/uL Final    Basophils Absolute 09/27/2022 0.2  0.0 - 0.2 K/uL Final    Sodium 09/27/2022 140  136 - 145 mmol/L Final    Potassium reflex Magnesium 09/27/2022 4.1  3.5 - 5.1 mmol/L Final    Chloride 09/27/2022 105  99 - 110 mmol/L Final    CO2 09/27/2022 17 (A)  21 - 32 mmol/L Final    Anion Gap 09/27/2022 18 (A)  3 - 16 Final    Glucose 09/27/2022 95  70 - 99 mg/dL Final    BUN 09/27/2022 13  7 - 20 mg/dL Final    Creatinine 09/27/2022 <0.5 (A)  0.6 - 1.1 mg/dL Final    GFR Non- 09/27/2022 >60  >60 Final    Comment: >60 mL/min/1.73m2 EGFR, calc. for ages 25 and older using the  MDRD formula (not corrected for weight), is valid for stable  renal function. GFR  09/27/2022 >60  >60 Final    Comment: Chronic Kidney Disease: less than 60 ml/min/1.73 sq.m. Kidney Failure: less than 15 ml/min/1.73 sq.m. Results valid for patients 18 years and older.       Calcium 09/27/2022 9.6  8.3 - 10.6 mg/dL Final    POC Glucose 09/30/2022 124 (A)  70 - 99 mg/dl Final    Performed on 09/30/2022 ACCU-CHEK   Final    WBC 10/07/2022 6.6  4.0 - 11.0 K/uL Final    RBC 10/07/2022 4.19  4.00 - 5.20 M/uL Final    Hemoglobin 10/07/2022 12.4  12.0 - 16.0 g/dL Final    Hematocrit 10/07/2022 36.6  36.0 - 48.0 % Final    MCV 10/07/2022 87.2  80.0 - 100.0 fL Final    MCH 10/07/2022 29.6  26.0 - 34.0 pg Final    MCHC 10/07/2022 34.0  31.0 - 36.0 g/dL Final    RDW 10/07/2022 13.0  12.4 - 15.4 % Final    Platelets 21/63/4469 221  135 - 450 K/uL Final    MPV 10/07/2022 8.4  5.0 - 10.5 fL Final    Neutrophils % 10/07/2022 54.3  % Final    Lymphocytes % 10/07/2022 33.6  % Final    Monocytes % 10/07/2022 9.2  % Final    Eosinophils % 10/07/2022 2.0  % Final    Basophils % 10/07/2022 0.9  % Final    Neutrophils Absolute 10/07/2022 3.6  1.7 - 7.7 K/uL Final    Lymphocytes Absolute 10/07/2022 2.2  1.0 - 5.1 K/uL Final    Monocytes Absolute 10/07/2022 0.6  0.0 - 1.3 K/uL Final    Eosinophils Absolute 10/07/2022 0.1  0.0 - 0.6 K/uL Final    Basophils Absolute 10/07/2022 0.1  0.0 - 0.2 K/uL Final    Sodium 10/07/2022 139  136 - 145 mmol/L Final    Potassium reflex Magnesium 10/07/2022 3.4 (A)  3.5 - 5.1 mmol/L Final    Chloride 10/07/2022 101  99 - 110 mmol/L Final    CO2 10/07/2022 27  21 - 32 mmol/L Final    Anion Gap 10/07/2022 11  3 - 16 Final    Glucose 10/07/2022 125 (A)  70 - 99 mg/dL Final    BUN 10/07/2022 8  7 - 20 mg/dL Final    Creatinine 10/07/2022 <0.5 (A)  0.6 - 1.1 mg/dL Final    GFR Non- 10/07/2022 >60  >60 Final    Comment: >60 mL/min/1.73m2 EGFR, calc. for ages 25 and older using the  MDRD formula (not corrected for weight), is valid for stable  renal function. GFR  10/07/2022 >60  >60 Final    Comment: Chronic Kidney Disease: less than 60 ml/min/1.73 sq.m. Kidney Failure: less than 15 ml/min/1.73 sq.m. Results valid for patients 18 years and older.       Calcium 10/07/2022 9.8  8.3 - 10.6 mg/dL Final    Total Protein 10/07/2022 7.0  6.4 - 8.2 g/dL Final    Albumin 10/07/2022 4.2  3.4 - 5.0 g/dL Final    Albumin/Globulin Ratio 10/07/2022 1.5  1.1 - 2.2 Final    Total Bilirubin 10/07/2022 0.4  0.0 - 1.0 mg/dL Final    Alkaline Phosphatase 10/07/2022 60  40 - 129 U/L Final    ALT 10/07/2022 78 (A)  10 - 40 U/L Final    AST 10/07/2022 61 (A)  15 - 37 U/L Final    Magnesium 10/07/2022 2.10  1.80 - 2.40 mg/dL Final            Medications  Current Facility-Administered Medications: traZODone (DESYREL) tablet 50 mg, 50 mg, Oral, Nightly PRN  [COMPLETED] paliperidone palmitate ER (INVEGA SUSTENNA) IM injection 234 mg (Patient Supplied), 234 mg, IntraMUSCular, Once **FOLLOWED BY** [COMPLETED] paliperidone palmitate ER (INVEGA SUSTENNA) IM injection 156 mg (Patient Supplied), 156 mg, IntraMUSCular, Once **FOLLOWED BY** [START ON 11/1/2022] paliperidone palmitate ER (INVEGA SUSTENNA) IM injection 234 mg, 234 mg, IntraMUSCular, Q30 Days  haloperidol lactate (HALDOL) injection 10 mg, 10 mg, IntraMUSCular, Q6H PRN **AND** diazePAM (VALIUM) injection 5 mg, 5 mg, IntraMUSCular, Q6H PRN **AND** diphenhydrAMINE (BENADRYL) injection 50 mg, 50 mg, IntraMUSCular, Q6H PRN  magnesium hydroxide (MILK OF MAGNESIA) 400 MG/5ML suspension 30 mL, 30 mL, Oral, Daily PRN  nicotine polacrilex (COMMIT) lozenge 2 mg, 2 mg, Oral, Q1H PRN  aluminum & magnesium hydroxide-simethicone (MAALOX) 200-200-20 MG/5ML suspension 30 mL, 30 mL, Oral, Q6H PRN  acetaminophen (TYLENOL) tablet 650 mg, 650 mg, Oral, Q4H PRN  LORazepam (ATIVAN) tablet 2 mg, 2 mg, Oral, BID    ASSESSMENT AND PLAN    Principal Problem:    Bipolar affective disorder, current episode manic with psychotic symptoms ([address]  Active Problems:    Hypokalemia  Resolved Problems:    * No resolved hospital problems. *       1. Patient s symptoms   are improving  2. Probable discharge is 3-4 d  3. Discharge planning is complete  4. Suicidal ideation is  none  5. Total time with patient was 40 minutes and more than 50 % of that time was spent counseling the patient on their symptoms, treatment and expected goals.

## 2022-10-11 NOTE — BH NOTE
Took medications, oral and injectable with much coaxing. Ambulating on milieu. Patient spoke with  on phone. Eating and drinking some.

## 2022-10-11 NOTE — GROUP NOTE
Group Therapy Note    Date: 10/10/2022    Group Start Time: 2100  Group End Time: 2140  Group Topic: Wrap-Up    Celi Peng        Group Therapy Note    Attendees: 12/21       Patient's Goal:  Patient did not set goal today    Notes:  Patient did not participate    Status After Intervention:  Unchanged    Participation Level: None      Level of consciousness:  Alert      Endings: None Reported    Modes of Intervention: Support      Discipline Responsible: [address], [employer]      Signature:  [facility]

## 2022-10-11 NOTE — PLAN OF CARE
Problem: Pain  Goal: Verbalizes/displays adequate comfort level or baseline comfort level  10/11/2022 0710 by Guru Mascorro RN  Outcome: Progressing     Problem: Safety - Adult  Goal: Free from fall injury  10/11/2022 0710 by Guru Mascorro RN  Outcome: Progressing     Problem: Psychosis  Goal: Will report no hallucinations or delusions  Description: INTERVENTIONS:  1. Administer medication as  ordered  2. Assist with reality testing to support increasing orientation  3. Assess if patient's hallucinations or delusions are encouraging self harm or harm to others and intervene as appropriate  Outcome: Progressing     Problem: Sleep Disturbance  Goal: Will exhibit normal sleeping pattern  Description: INTERVENTIONS:  1. Administer medication as ordered  2. Decrease environmental stimuli, including noise, as appropriate  3. Discourage social isolation and naps during the day  Outcome: Not Progressing      Pt is improving overall. She continues having some paranoia. Sometimes she continues to just not talk or answer anyone. She appears at time to RTIS. She does brighten a bit at times. Tonight she came out to offer us bread that her  had brought and seemed so pleased when we accepted. Sleep has been irregular. Contacted Orestes KOWALSKI who ordered Trazodone 50 prn. It took a lone time after the dose given at [facility] for pt to sleep at all. Then her sleep was intermittent. Pt seems to be remembering better. She has had a steady gait. Sandra seems sad and empty overall, but her comfort level of the unit and staff are improving. Pt continues to take meds without difficulty.

## 2022-10-12 PROCEDURE — 1240000000 HC EMOTIONAL WELLNESS R&B

## 2022-10-12 PROCEDURE — 6370000000 HC RX 637 (ALT 250 FOR IP): Performed by: PSYCHIATRY & NEUROLOGY

## 2022-10-12 RX ADMIN — LORAZEPAM 2 MG: 2 TABLET ORAL at 09:58

## 2022-10-12 RX ADMIN — LORAZEPAM 2 MG: 2 TABLET ORAL at 20:00

## 2022-10-12 NOTE — GROUP NOTE
Group Therapy Note    Date: 10/12/2022    Group Start Time: 1000  Group End Time: 8960  Group Topic: Psychoeducation    [facility]    BRII José        Group Therapy Note  Clinician introduced SMART goals to the group members. The clinician provided handouts to the group members. After the clinician introduce the topic, group members took turns practicing smart goals with the group members providing input. The group members decided to work on [employer] goal of removing the words Can't, Won't, Try and Maybe out of their vocabulary. They all obtained a journal and will akshat every time the recognize the words coming out of their mouths. They are planning on holding each other accountable and will let other's know if they hear them speak to words. They will practice for the duration of their stay. Attendees: 11       Patient's Goal:      Notes:  Patient was cooperative and fully engaged in the group discussion and activity. Patient shared her thoughts and provided supportive statements to other. She participated in the group activity of practicing other members smart goals. Status After Intervention:  Improved    Participation Level:  Active Listener and Interactive    Participation Quality: Appropriate, Attentive, Sharing, and Supportive      Speech:  normal      Thought Process/Content: Logical      Affective Functioning: Congruent      Mood: anxious      Level of consciousness:  Attentive      Response to Learning: Able to retain information      Endings: None Reported    Modes of Intervention: Education, Support, and Activity      Discipline Responsible: /Counselor      Signature:  BRII José

## 2022-10-12 NOTE — GROUP NOTE
Group Therapy Note    Date: 10/12/2022    Group Start Time: 1300  Group End Time: 3770  Group Topic: [facility]        Group Therapy Note    Music Therapy group consisted of araceli analysis intervention and verbal processing/counseling. Facilitator led discussion exploring recognition of where they feel peace, happiness, and what separates peace from happiness in their life. Group members explored relaxation and the need to \"take a break\" for the self. Goals addressed include socialization, developing positive coping strategies, and setting goals for post-discharge. Attendees: 10       Notes:  During session, Cassie Barrow was present and attentive, smiling throughout. She stood and approached facilitator with a piece of paper on which she had written \"[place]\". At close of session, Sandra expressed gratitude and said \"Was good. \"    Status After Intervention:  Improved    Participation Level:  Active Listener and Interactive    Participation Quality: Sharing and Supportive      Speech:  normal      Thought Process/Content: Logical      Affective Functioning: Congruent      Mood: euthymic      Level of consciousness:  Alert and Oriented x4      Response to Learning: Able to verbalize current knowledge/experience, Capable of insight, and Progressing to goal      Endings: None Reported    Modes of Intervention: Education, Support, Socialization, Exploration, Activity, and Media      Discipline Responsible: Psychoeducational Specialist      Signature:  Roland Nieves, MM, MT-BC

## 2022-10-12 NOTE — PLAN OF CARE
Problem: Pain  Goal: Verbalizes/displays adequate comfort level or baseline comfort level  Outcome: Progressing     Problem: Safety - Adult  Goal: Free from fall injury  Outcome: Progressing     Problem: Psychosis  Goal: Will report no hallucinations or delusions  Description: INTERVENTIONS:  1. Administer medication as  ordered  2. Assist with reality testing to support increasing orientation  3. Assess if patient's hallucinations or delusions are encouraging self harm or harm to others and intervene as appropriate  Outcome: Progressing     Problem: Sleep Disturbance  Goal: Will exhibit normal sleeping pattern  Description: INTERVENTIONS:  1. Administer medication as ordered  2. Decrease environmental stimuli, including noise, as appropriate  3. Discourage social isolation and naps during the day  Outcome: Progressing     Problem: Nutrition Deficit:  Goal: Optimize nutritional status  Outcome: Progressing   Pt is overall improving. However, pt seemed better nights on 10-10 than on 10-11. Early in the shift pt seem less aware of her surroundings. She seemed to be starring out quite a bit, with her mouth open, and seemed to possibly be RTIS. She denies any AVH. She denies any desire to harm/kill self. Interpretor helping with this conversation. Pt slightly more guarded this shift. Pt has been pacing a lot this evening. For the first time in several days pt did not want to take her po Ativan 2 mg. Sat down with pt, interpretor and this nurse. Discussed with pt that if she doesn't take her ordered meds, the doctor will assume she won't take them at home and that she is not ready for DC. Pt listened but would not take the medications. Invited her to come and let this nurse know if she changed her mind. Pt to the desk about a half hour later and did take her med. Pt did fall asleep and got about 6.5 hours of s/w interrupted sleep. Pt did eat and drink this shift.

## 2022-10-12 NOTE — PROGRESS NOTES
Department of Psychiatry  AttendingProgress Note  Chief Complaint: psychosis  Dutch Martinez is on the unit and interacting more appropriately. She is responsive to my questions although soft tone. No side effects from [place] . Plan for dc tomorrow to home with . Patient's chart was reviewed and collaborated with  about the treatment plan. SUBJECTIVE:    Patient is feeling better. Suicidal ideation:  denies suicidal ideation. Patient does not have medication side effects. ROS: Patient has new complaints: no  Sleeping adequately:  Yes   Appetite adequate: Yes  Attending groups: No:   Visitors:Yes    OBJECTIVE    Physical  VITALS:  /89   Pulse (!) 107   Temp 98.6 °F (37 °C) (Oral)   Resp 18   Ht 5' 4\" (1.626 m)   Wt 179 lb (81.2 kg)   LMP  (LMP Unknown)   SpO2 99%   BMI 30.73 kg/m²     Mental Status Examination:  Patients appearance was ill-appearing. Thoughts are Paucity of Ideas. Homicidal ideations none. No abnormal movements, tics or mannerisms. Memory intact Aims 0. Concentration Fair. Alert and oriented X 4. Insight and Judgement impaired insight. Patient was cooperative.  Patient gait normal. Mood constricted, affect flat affect Hallucinations Absent, suicidal ideations no specific plan to harm self Speech soft  Data  Labs:   Admission on 09/23/2022   Component Date Value Ref Range Status    POC Glucose 09/26/2022 80  70 - 99 mg/dl Final    Performed on 09/26/2022 ACCU-CHEK   Final    WBC 09/27/2022 7.8  4.0 - 11.0 K/uL Final    RBC 09/27/2022 4.55  4.00 - 5.20 M/uL Final    Hemoglobin 09/27/2022 13.3  12.0 - 16.0 g/dL Final    Hematocrit 09/27/2022 40.0  36.0 - 48.0 % Final    MCV 09/27/2022 87.9  80.0 - 100.0 fL Final    MCH 09/27/2022 29.3  26.0 - 34.0 pg Final    MCHC 09/27/2022 33.3  31.0 - 36.0 g/dL Final    RDW 09/27/2022 13.1  12.4 - 15.4 % Final    Platelets 34/60/0343 315  135 - 450 K/uL Final    MPV 09/27/2022 7.7  5.0 - 10.5 fL Final    Neutrophils % 09/27/2022 73.1  % Final    Lymphocytes % 09/27/2022 17.4  % Final    Monocytes % 09/27/2022 6.2  % Final    Eosinophils % 09/27/2022 0.6  % Final    Basophils % 09/27/2022 2.7  % Final    Neutrophils Absolute 09/27/2022 5.7  1.7 - 7.7 K/uL Final    Lymphocytes Absolute 09/27/2022 1.4  1.0 - 5.1 K/uL Final    Monocytes Absolute 09/27/2022 0.5  0.0 - 1.3 K/uL Final    Eosinophils Absolute 09/27/2022 0.0  0.0 - 0.6 K/uL Final    Basophils Absolute 09/27/2022 0.2  0.0 - 0.2 K/uL Final    Sodium 09/27/2022 140  136 - 145 mmol/L Final    Potassium reflex Magnesium 09/27/2022 4.1  3.5 - 5.1 mmol/L Final    Chloride 09/27/2022 105  99 - 110 mmol/L Final    CO2 09/27/2022 17 (A)  21 - 32 mmol/L Final    Anion Gap 09/27/2022 18 (A)  3 - 16 Final    Glucose 09/27/2022 95  70 - 99 mg/dL Final    BUN 09/27/2022 13  7 - 20 mg/dL Final    Creatinine 09/27/2022 <0.5 (A)  0.6 - 1.1 mg/dL Final    GFR Non- 09/27/2022 >60  >60 Final    Comment: >60 mL/min/1.73m2 EGFR, calc. for ages 25 and older using the  MDRD formula (not corrected for weight), is valid for stable  renal function. GFR  09/27/2022 >60  >60 Final    Comment: Chronic Kidney Disease: less than 60 ml/min/1.73 sq.m. Kidney Failure: less than 15 ml/min/1.73 sq.m. Results valid for patients 18 years and older.       Calcium 09/27/2022 9.6  8.3 - 10.6 mg/dL Final    POC Glucose 09/30/2022 124 (A)  70 - 99 mg/dl Final    Performed on 09/30/2022 ACCU-CHEK   Final    WBC 10/07/2022 6.6  4.0 - 11.0 K/uL Final    RBC 10/07/2022 4.19  4.00 - 5.20 M/uL Final    Hemoglobin 10/07/2022 12.4  12.0 - 16.0 g/dL Final    Hematocrit 10/07/2022 36.6  36.0 - 48.0 % Final    MCV 10/07/2022 87.2  80.0 - 100.0 fL Final    MCH 10/07/2022 29.6  26.0 - 34.0 pg Final    MCHC 10/07/2022 34.0  31.0 - 36.0 g/dL Final    RDW 10/07/2022 13.0  12.4 - 15.4 % Final    Platelets 94/52/0043 221  135 - 450 K/uL Final    MPV 10/07/2022 8.4  5.0 - 10.5 fL Final    Neutrophils % 10/07/2022 54.3  % Final    Lymphocytes % 10/07/2022 33.6  % Final    Monocytes % 10/07/2022 9.2  % Final    Eosinophils % 10/07/2022 2.0  % Final    Basophils % 10/07/2022 0.9  % Final    Neutrophils Absolute 10/07/2022 3.6  1.7 - 7.7 K/uL Final    Lymphocytes Absolute 10/07/2022 2.2  1.0 - 5.1 K/uL Final    Monocytes Absolute 10/07/2022 0.6  0.0 - 1.3 K/uL Final    Eosinophils Absolute 10/07/2022 0.1  0.0 - 0.6 K/uL Final    Basophils Absolute 10/07/2022 0.1  0.0 - 0.2 K/uL Final    Sodium 10/07/2022 139  136 - 145 mmol/L Final    Potassium reflex Magnesium 10/07/2022 3.4 (A)  3.5 - 5.1 mmol/L Final    Chloride 10/07/2022 101  99 - 110 mmol/L Final    CO2 10/07/2022 27  21 - 32 mmol/L Final    Anion Gap 10/07/2022 11  3 - 16 Final    Glucose 10/07/2022 125 (A)  70 - 99 mg/dL Final    BUN 10/07/2022 8  7 - 20 mg/dL Final    Creatinine 10/07/2022 <0.5 (A)  0.6 - 1.1 mg/dL Final    GFR Non- 10/07/2022 >60  >60 Final    Comment: >60 mL/min/1.73m2 EGFR, calc. for ages 25 and older using the  MDRD formula (not corrected for weight), is valid for stable  renal function. GFR  10/07/2022 >60  >60 Final    Comment: Chronic Kidney Disease: less than 60 ml/min/1.73 sq.m. Kidney Failure: less than 15 ml/min/1.73 sq.m. Results valid for patients 18 years and older.       Calcium 10/07/2022 9.8  8.3 - 10.6 mg/dL Final    Total Protein 10/07/2022 7.0  6.4 - 8.2 g/dL Final    Albumin 10/07/2022 4.2  3.4 - 5.0 g/dL Final    Albumin/Globulin Ratio 10/07/2022 1.5  1.1 - 2.2 Final    Total Bilirubin 10/07/2022 0.4  0.0 - 1.0 mg/dL Final    Alkaline Phosphatase 10/07/2022 60  40 - 129 U/L Final    ALT 10/07/2022 78 (A)  10 - 40 U/L Final    AST 10/07/2022 61 (A)  15 - 37 U/L Final    Magnesium 10/07/2022 2.10  1.80 - 2.40 mg/dL Final            Medications  Current Facility-Administered Medications: traZODone (DESYREL) tablet 50 mg, 50 mg, Oral, Nightly PRN  [COMPLETED] paliperidone palmitate ER (INVEGA SUSTENNA) IM injection 234 mg (Patient Supplied), 234 mg, IntraMUSCular, Once **FOLLOWED BY** [COMPLETED] paliperidone palmitate ER (INVEGA SUSTENNA) IM injection 156 mg (Patient Supplied), 156 mg, IntraMUSCular, Once **FOLLOWED BY** [START ON 11/1/2022] paliperidone palmitate ER (INVEGA SUSTENNA) IM injection 234 mg, 234 mg, IntraMUSCular, Q30 Days  haloperidol lactate (HALDOL) injection 10 mg, 10 mg, IntraMUSCular, Q6H PRN **AND** diazePAM (VALIUM) injection 5 mg, 5 mg, IntraMUSCular, Q6H PRN **AND** diphenhydrAMINE (BENADRYL) injection 50 mg, 50 mg, IntraMUSCular, Q6H PRN  magnesium hydroxide (MILK OF MAGNESIA) 400 MG/5ML suspension 30 mL, 30 mL, Oral, Daily PRN  nicotine polacrilex (COMMIT) lozenge 2 mg, 2 mg, Oral, Q1H PRN  aluminum & magnesium hydroxide-simethicone (MAALOX) 200-200-20 MG/5ML suspension 30 mL, 30 mL, Oral, Q6H PRN  acetaminophen (TYLENOL) tablet 650 mg, 650 mg, Oral, Q4H PRN  LORazepam (ATIVAN) tablet 2 mg, 2 mg, Oral, BID    ASSESSMENT AND PLAN    Principal Problem:    Bipolar affective disorder, current episode manic with psychotic symptoms ([facility] [address]  Active Problems:    Hypokalemia  Resolved Problems:    * No resolved hospital problems. *       1. Patient s symptoms   are improving  2. Probable discharge is tomorrow  3. Discharge planning is complete  4. Suicidal ideation is  none  5. Total time with patient was 40 minutes and more than 50 % of that time was spent counseling the patient on their symptoms, treatment and expected goals.

## 2022-10-13 VITALS
TEMPERATURE: 98.4 F | SYSTOLIC BLOOD PRESSURE: 135 MMHG | HEART RATE: 115 BPM | RESPIRATION RATE: 16 BRPM | DIASTOLIC BLOOD PRESSURE: 93 MMHG | BODY MASS INDEX: 30.56 KG/M2 | HEIGHT: 64 IN | OXYGEN SATURATION: 98 % | WEIGHT: 179 LBS

## 2022-10-13 PROCEDURE — 6370000000 HC RX 637 (ALT 250 FOR IP): Performed by: PSYCHIATRY & NEUROLOGY

## 2022-10-13 PROCEDURE — 1240000000 HC EMOTIONAL WELLNESS R&B

## 2022-10-13 PROCEDURE — 5130000000 HC BRIDGE APPOINTMENT

## 2022-10-13 RX ORDER — LORAZEPAM 1 MG/1
1 TABLET ORAL 2 TIMES DAILY
Qty: 14 TABLET | Refills: 0 | Status: SHIPPED | OUTPATIENT
Start: 2022-10-13 | End: 2022-11-12

## 2022-10-13 RX ORDER — LORAZEPAM 1 MG/1
1 TABLET ORAL 2 TIMES DAILY
Status: DISCONTINUED | OUTPATIENT
Start: 2022-10-13 | End: 2022-10-14 | Stop reason: HOSPADM

## 2022-10-13 RX ADMIN — LORAZEPAM 2 MG: 2 TABLET ORAL at 09:45

## 2022-10-13 NOTE — DISCHARGE INSTRUCTIONS
Advanced Directives:  Patient does not have a surrogate decision maker appointed   Name (if yes): declined Phone Number:   Patient does not have a psychiatric and/ or medical advanced directive or power of . Patient was offered psychiatric and/ or medical advanced directive or power of  information/completion but declined to complete   Why not? declined    Discharge Planning is Complete. Discharge Date: 10/13/2022  Reason for Hospitalization: Failure to care for self  Discharge Diagnosis: Bipolar Affective Disorder  Discharging to: Private Domicile    Your instructions: Your physician here was Trinidad Moon MD. If you have any questions please call the unit at [phone] for the adult unit or [phone] for [facility]. Please note that we have a patient family advisory [place] that meets the second Wednesday of January and the second Wednesday of July at [address] [facility] in [place] at [facility]. Department leadership would love for you to attend to give feedback on what we are doing well and areas in which we can improve our patient care. Please come if you are able and feel free to reach out to [facility] at [phone] with any questions. The National Suicide and Crisis Hotline Number is 65. You can call, chat, or text this number at any time to access emergency mental health services. Please follow up with your PCP regarding any pending labs. You are connected to services at [facility]. You have received care at this agency in the past and they have been informed of your discharge and need to follow up with this agency. Call this agency at [phone] to schedule a time to come in. This is where you will get your medicine continued. Name of Provider: [facility]  Provider specialty/license: outpatient behavioral health   Date and time of appointment: Follow up by 10/20/2022 by calling [phone].   They have been informed of your discharge but it is unlikely they will call you back so call them. The type/s of services requested are: medicine management  Agency name: [facility]   Address: [place] [address], [address]  Phone Number:  [phone]  Special instructions (what to bring to appointment, etc.):       We also want to express the availability of Bridge Appointments here at [facility]. If you are connecting to community services and you find you have a long wait time until you can see your new psychiatrist, therapist, or primary care physician, know that we can provide health care services to you in the time between your care here and the beginning of your care with your new providers. As long as you have psychiatry and therapy appointments scheduled we can see you for medicine management. If you need a Bridge Appointment please call Abhinav Dong at [phone]. Discharge Completed By: Rebecca BRADY  Fax to: Follow up provider name and number      The following personal items were collected during your admission and were returned to you:    Belongings  Dental Appliances: None  Vision - Corrective Lenses: None  Hearing Aid: None  Clothing: Footwear, Pants, Shirt  Jewelry: None  Body Piercings Removed: N/A  Electronic Devices: None  Weapons (Notify Protective Services/Security): None  Other Valuables: Other (Comment) (None)  Home Medications: None  Valuables Given To: Other (Comment) (In the safe)  Provide Name(s) of Who Valuable(s) Were Given To: In the safe    By signing below, I understand and acknowledge receipt of the instructions indicated above.

## 2022-10-13 NOTE — PLAN OF CARE
Problem: Psychosis  Goal: Will report no hallucinations or delusions  Description: INTERVENTIONS:  1. Administer medication as  ordered  2. Assist with reality testing to support increasing orientation  3. Assess if patient's hallucinations or delusions are encouraging self harm or harm to others and intervene as appropriate  10/12/2022 2137 by Mackenzie Sunshine RN  Outcome: Progressing  10/12/2022 1826 by Tammie Wright RN  Outcome: Progressing  10/12/2022 0929 by Leticia Romero RN  Outcome: Progressing     Problem: Sleep Disturbance  Goal: Will exhibit normal sleeping pattern  Description: INTERVENTIONS:  1. Administer medication as ordered  2. Decrease environmental stimuli, including noise, as appropriate  3.  Discourage social isolation and naps during the day  10/12/2022 2137 by Mackenzie Sunshine RN  Outcome: Progressing  10/12/2022 1826 by Tammie Wright RN  Outcome: Progressing  10/12/2022 0929 by Leticia Romero RN  Outcome: Progressing

## 2022-10-13 NOTE — PROGRESS NOTES
Group Therapy Note    Date: 10/13/2022  Start Time: 1300  End Time:  6734  Number of Participants: 8    Type of Group: Music Group    Notes:  Pt present for Music Group. Pt actively participated by making song selections and singing along to music. Participation Level:  Active Listener and Interactive    Participation Quality: Appropriate and Attentive      Speech:  normal      Affective Functioning: Congruent      Endings: None Reported    Modes of Intervention: Support, Socialization, Activity, and Media      Discipline Responsible: Chaplain Jones Comment       10/13/22 1525   Encounter Summary   Encounter Overview/Reason  Behavioral Health   Service Provided For: Patient   Last Encounter    (10/13 Music Group)   Complexity of Encounter Moderate   Begin Time 1300   End Time  1345   Total Time Calculated 45 min   Behavioral Health    Type  Spirituality Group

## 2022-10-13 NOTE — PLAN OF CARE
Sandra Domingo is up ad tony on the unit. She is pleasant and cooperative with care. Medication was taken without difficulty. Her appetite is good. She has been interacting with her peers and the student nurses. During singing group she sang along to two [place] songs and was smiling. She participated in her assessment. She answers yes and no questions, but is unable to expound on her answers. She is to be discharged today and she called her  for his credit card number for her medications. He is picking her up this evening, but she did not know what time.

## 2022-10-14 NOTE — PROGRESS NOTES
[facility]  Discharge Note    Pt discharged with followings belongings:   Dental Appliances: None  Vision - Corrective Lenses: None  Hearing Aid: None  Jewelry: None  Body Piercings Removed: N/A  Clothing: Footwear, Pants, Shirt  Other Valuables: Other (Comment) (None)   Valuables were returned to patient. Patient educated on aftercare instructions: Yes, and what to do if she starts to feel bad again. Patient verbalize understanding of AVS:  yes and  understood discharge instructions as well. .    Status EXAM upon discharge:  Mental Status and Behavioral Exam  Normal: Yes  Level of Assistance: Independent/Self  Facial Expression: Brightened  Affect: Congruent  Level of Consciousness: Alert  Frequency of Checks: 4 times per hour, close  Mood:Normal: Yes  Mood: Anxious  Motor Activity:Normal: Yes  Motor Activity: Decreased  Eye Contact: Good  Observed Behavior: Cooperative, Friendly  Sexual Misconduct History: Current - no  Preception: [place] to person, [place] to time, [place] to place  Attention:Normal: Yes  Attention: Distractible  Thought Processes: Circumstantial  Thought Content:Normal: No  Thought Content: Poverty of content  Depression Symptoms: No problems reported or observed. Anxiety Symptoms: No problems reported or observed. Rhonda Symptoms: No problems reported or observed.   Hallucinations: None  Delusions: No  Delusions: Paranoid  Memory:Normal:  (DEVIN)  Memory: Poor recent  Insight and Judgment: No  Insight and Judgment: Poor judgment, Poor insight    Tobacco Screening:  Practical Counseling, on admission, akshat X, if applicable and completed (first 3 are required if patient doesn't refuse):            ( ) Recognizing danger situations (included triggers and roadblocks)                    ( ) Coping skills (new ways to manage stress,relaxation techniques, changing routine, distraction)                                                           ( ) Basic information about quitting (benefits of quitting, techniques in how to quit, available resources  ( ) Referral for counseling faxed to [facility]                                                                                                                   ( ) Patient refused counseling  ( X) Patient refused referral  ( ) Patient refused prescription upon discharge  ( ) Patient has not smoked in the last 30 days    Metabolic Screening:    No results found for: LABA1C    No results found for: CHOL  No results found for: TRIG  No results found for: HDL  No components found for: LDLCAL  No results found for: Alysha Wyatt RN

## 2022-10-14 NOTE — PROGRESS NOTES
Bridge Appointment completed: Reviewed Discharge Instructions with patient and patients  [facility]. Patient verbalizes understanding and agreement with the discharge plan using the teachback method.  in agreement with discharge instructions.      Referral for Outpatient Tobacco Cessation Counseling, upon discharge (akshat X if applicable and completed):    ( )  Hospital staff assisted patient to call Quit Line or faxed referral                                   during hospitalization                  ( )  Recognizing danger situations (included triggers and roadblocks), if not completed on admission                    ( )  Coping skills (new ways to manage stress, exercise, relaxation techniques, changing routine, distraction), if not completed on admission                                                           ( )  Basic information about quitting (benefits of quitting, techniques in how to quit, available resources, if not completed on admission  ( ) Referral for counseling faxed to [facility]   ( X) Patient refused referral  ( ) Patient refused counseling  ( ) Patient refused smoking cessation medication upon discharge    Vaccinations (akshat X if applicable and completed):  ( ) Patient states already received influenza vaccine elsewhere  ( ) Patient received influenza vaccine during this hospitalization  (X ) Patient refused influenza vaccine at this time

## 2022-10-14 NOTE — PROGRESS NOTES
Discharge Event:   Patient was discharged from the hospital. She was driven home by [facility]. Discharge instructions were reviewed with the patient and her . Patient understood that she needs to continue to take her medications to prevent decompensation of mental health issues.

## 2022-10-17 NOTE — DISCHARGE SUMMARY
Discharge Summary   Admit Date: 9/23/2022   Discharge Date:  10/13/2022    Condition at DC stable  Spent over 40 minutes with patient and staff on [facility] with more than 50 % of time spent with patient discussing care  Final Dx: axis I:   Bipolar affective disorder, current episode manic with psychotic symptoms ([address]   Catatonia  Axis 2: No diagnosis  Lanette 3: See Medical History    And Present on Admission:   Bipolar affective disorder, current episode manic with psychotic symptoms ([address]   Hypokalemia     Axis 4: Problems related to the social environment  Axis 5:  On Admission: 21-30 behavior considerably influenced by delusions or hallucinations OR serious impairment in judgment, communication OR inability to function in almost all areas At Discharge: 51-60 moderate symptoms   All conditions on Axis 1 and Axis 2 and active problems on Axis 3 were treated while patient was hospitalized. [facility] Problems    Diagnosis Date Noted    Hypokalemia [E87.6] 09/24/2022     Priority: Medium    Bipolar affective disorder, current episode manic with psychotic symptoms ([address] [F31.2] 09/23/2022     Priority: Medium   )   Condition on DC  Mood and affect are stable and pt is not suicidal   VITALS:  BP (!) 135/93   Pulse (!) 115 Comment: notified nurse  Temp 98.4 °F (36.9 °C) (Oral)   Resp 16   Ht 5' 4\" (1.626 m)   Wt 179 lb (81.2 kg)   LMP  (LMP Unknown)   SpO2 98%   BMI 30.73 kg/m²   Brief Summary Present Illness   CC: psychotic symptoms      HPI: per Dr. Jacki Nichole  According to the M[facility] notes:  Her  called reporting that the patient had not slept in days, was paranoid and fearful, and hallucinating. He told them she was afraid people may harm her. He said she had been taking herbal supplements from her mother but no other treatment for several weeks. She was unwilling to go with him to the ER so he called the [facility]. Ultimately she was brought to  by police.       When the  [facility] tried to meet with her she became fearful and declined to speak with them. She tried to prevent her  from speaking with them too. She became tearful and cried \"no, no, no!!\" She was \"selectively mute\" and \"showing signs of catatonia. \"     According to our admitting RN note:  Pt arrived via stretcher at 0945. She looked around suspiciously and began to cry. She speaks little [place] and ambulance personnel was using a translation neto to talk with her. She told them she was afraid to be here and began to cry. Attempts to calm the pt were ineffective. The  was utilized to speak to her. Through  #[id number] The pt was able to say that she didn't know why she was here. She states she was asleep when the police came and took her to the hospital. She states nothing is wrong with her. She did eventually talk some. She states she is from [facility], is  and has 2 boys age 6 and 15. She continues to say she didn't want to be here, but was more calm. She has been walking around the unit, not interactive. She was offered a breakfast tray and did sit to eat it. She declined to answer anymore questions and efforts are being made to get an  to come in and sit with her and assist with the admission process. She does deny voices or visual hallucinations. She denies any thoughts of harming  herself or others. Later in the day yesterday:  Hyun Maza remains intermittently upset. She took a nap for about 1 hour this afternoon. She ate a few bites of her lunch. She will not talk to staff even using the . She yells that she doesn't want to be here. Pt became upset when making a call and it went to voice mail. She began to wail loudly. Staff unable to console her. Call placed to pts  and she accepted it. She began to yell at him. Once done her  spoke with writer and he states she is upset that she has no towels or shampoo in her room and she wants to shower.  Everything was provided for personal care and pt was calm. She refused her clothing because they are \"not clean. \" Her  stated he would be in, but after visiting hours. He arrived after 1630 and he and pt are in [employer]. She yells at times, but is calm now. She continues to refuse her vital signs and assessment. I attempted to meet with her yesterday and this morning. She declined to speak with me, looked fearful, and walked away. She has been behavior stable since yesterday afternoon. She has not accepted medication since admission. Hospital Course  Patient had an extensive hospitalization but eventually stabilized on meds and milieu treatment. She was probated to the hospital and was granted a forced med hearing. 9/26 Sandra was in bed when I met with her. She made minimal eye contact and had a blanket pulled up to her mid face. She made no effort to engage and appeared to be anxious. Is not willing to use the  screen. Awaiting an  on the unit. No outbursts since last Friday 9/23.     9/27 Sandra continues to resist tx. She refused medication and refused to allow hospitalist to do an eval. I also sat in with the green screen and hospitalist and Breanna Spring would shake her head yes or no to quesitons. When I asked her about taking medication she shook her head, to imply that she agree to take meds but then when presented with it , she refused to take Ativan. She appeared to understand the . Later she walked around the unit with his arm out to her side and hands extended. Covered with a blanket. Is  not observed to be eating or drinking although there is food in her room. Refuses blood work so it's difficult to assess po intake. Appears to be walking freely about the unit . Is not confined to bed. Will pursue probate and forced med hearing. 9/28 Last evening Sandra was pacing the unit and screamed. Medication was offered and she refused.  Eventually given Zyprexa 10 gm IM and she continued to cry. Noncompliant with ipad and . Today she was seen along with the  and she was resistant to talking and responded with a \"no\" for most questions. She did not walk away and stood in her doorway and listening to us. She responded with minimal words when she did respond. She is disoriented to place, reason for being hospitalized. She stated that she drinks and eats a little but would not be specific with any responses. She did change clothes for the first time. Probate has been set for tomorrow and she continues to not eat/drink or cooperate with treatment and since we have no blood work due to her refusal, it is difficult to know her metabolic status. Will most likely require forced med hearing. 9/29 Probated to [facility] today . She did not attend the hearing. Today I met with her and the  and she appeared more animated and willing to engage. Although she responded with minimal words she did answer with more than \"No\" today . She stated her 's name and her 2 kid's names. Attempted to call  but tne stopped. She asked for a tray of food and then declined. Appears less disheveled. Refusing meds    10/2 Hyun Maza had an episode yesterday with aggressive behaviors resulting in a restraint. She was given Zyprexa/valium/Benadryl prn. This am she was aggressive again , slamming doors, yelling and kicking. Code violet called. Received Fofrzr17 mg//Valium 10 mg /Benadryl 50 mg IM . Is sleeping in room. Did take her scheduled Ativan po. Calmer currently. 10/3 Sandra was seen with . She was in bed and was non communicative. She would not respond to the . Was in bed and made minimal eye contact and appeared to be trembling . She has deteriorated since 9/30 when she was more animated and talkative. Had to be restrained over the weekend and yesterday received Haldol 10 mg prn. Will file for forced med hearing this week. Appears to be drinking fluids but minimal food. Refuses all meds and lab work    Cristina Nelson continues to refuse medication. She was standing in her room today , repeatedly hitting the call light . She was eventually locked out of room . The  has been working extensively to communicate with Lexii Whitney as she has been resistant, Was offered her Ativan Risperdal Mtab today and she refused. We had her speak with her mother and she did speak with her  but mother was unable to get her to comply with meds. Gabino Hamm would speak with nadeem today and would only say \"no\" when meds were offered. Appears paranoid with staff and . She drink approx 6 oz Coke while standing in front of me. Will encourage Risperdal Mtab and if able to take, will work on [place] after forced med hearing. Will request a forced med hearing due to ongoing med refusal and ongoing psychosis    10/6 Forced medication hearing today along with Jessica Ramírez, Dr. Dayana Ontiveros and Tg Veloz , JULIO C  Sandra would not attend. Forced medication was granted . Will begin Invega Sustenna 234 mg IM. She     10/7 Lloyd Noris has shown improvement with regard to self care. She is eating more of her meals, and drinking . She was more engaged with me today and answered questions through the . She smiled a couple of times and when I explained the injection of Invega she shook her head as if to agree. She had taken Ativan and Invega yesterday. And Ativan this AM. Will get [place] today . Will attempt to get blood work as well    10/11Lorjeremi is showing gradual improvement. She walks the unit and has a constricted affect. She offered me baked goods from home today. Answered questions in a soft voice. Received the second [place] today . Will hopefully dc by end of week                  Patient was discharged to home to continue recovery in the community.    PE: (reviewed) and labs (see medical H&PE)  Labs:    Admission on 09/23/2022, Discharged on 10/13/2022   Component Date Value Ref Range Status    POC Glucose 09/26/2022 80  70 - 99 mg/dl Final    Performed on 09/26/2022 ACCU-CHEK   Final    WBC 09/27/2022 7.8  4.0 - 11.0 K/uL Final    RBC 09/27/2022 4.55  4.00 - 5.20 M/uL Final    Hemoglobin 09/27/2022 13.3  12.0 - 16.0 g/dL Final    Hematocrit 09/27/2022 40.0  36.0 - 48.0 % Final    MCV 09/27/2022 87.9  80.0 - 100.0 fL Final    MCH 09/27/2022 29.3  26.0 - 34.0 pg Final    MCHC 09/27/2022 33.3  31.0 - 36.0 g/dL Final    RDW 09/27/2022 13.1  12.4 - 15.4 % Final    Platelets 23/17/4220 315  135 - 450 K/uL Final    MPV 09/27/2022 7.7  5.0 - 10.5 fL Final    Neutrophils % 09/27/2022 73.1  % Final    Lymphocytes % 09/27/2022 17.4  % Final    Monocytes % 09/27/2022 6.2  % Final    Eosinophils % 09/27/2022 0.6  % Final    Basophils % 09/27/2022 2.7  % Final    Neutrophils Absolute 09/27/2022 5.7  1.7 - 7.7 K/uL Final    Lymphocytes Absolute 09/27/2022 1.4  1.0 - 5.1 K/uL Final    Monocytes Absolute 09/27/2022 0.5  0.0 - 1.3 K/uL Final    Eosinophils Absolute 09/27/2022 0.0  0.0 - 0.6 K/uL Final    Basophils Absolute 09/27/2022 0.2  0.0 - 0.2 K/uL Final    Sodium 09/27/2022 140  136 - 145 mmol/L Final    Potassium reflex Magnesium 09/27/2022 4.1  3.5 - 5.1 mmol/L Final    Chloride 09/27/2022 105  99 - 110 mmol/L Final    CO2 09/27/2022 17 (A)  21 - 32 mmol/L Final    Anion Gap 09/27/2022 18 (A)  3 - 16 Final    Glucose 09/27/2022 95  70 - 99 mg/dL Final    BUN 09/27/2022 13  7 - 20 mg/dL Final    Creatinine 09/27/2022 <0.5 (A)  0.6 - 1.1 mg/dL Final    GFR Non- 09/27/2022 >60  >60 Final    Comment: >60 mL/min/1.73m2 EGFR, calc. for ages 25 and older using the  MDRD formula (not corrected for weight), is valid for stable  renal function. GFR  09/27/2022 >60  >60 Final    Comment: Chronic Kidney Disease: less than 60 ml/min/1.73 sq.m. Kidney Failure: less than 15 ml/min/1.73 sq.m.   Results valid for patients 18 years and older. Calcium 09/27/2022 9.6  8.3 - 10.6 mg/dL Final    POC Glucose 09/30/2022 124 (A)  70 - 99 mg/dl Final    Performed on 09/30/2022 ACCU-CHEK   Final    WBC 10/07/2022 6.6  4.0 - 11.0 K/uL Final    RBC 10/07/2022 4.19  4.00 - 5.20 M/uL Final    Hemoglobin 10/07/2022 12.4  12.0 - 16.0 g/dL Final    Hematocrit 10/07/2022 36.6  36.0 - 48.0 % Final    MCV 10/07/2022 87.2  80.0 - 100.0 fL Final    MCH 10/07/2022 29.6  26.0 - 34.0 pg Final    MCHC 10/07/2022 34.0  31.0 - 36.0 g/dL Final    RDW 10/07/2022 13.0  12.4 - 15.4 % Final    Platelets 63/13/8694 221  135 - 450 K/uL Final    MPV 10/07/2022 8.4  5.0 - 10.5 fL Final    Neutrophils % 10/07/2022 54.3  % Final    Lymphocytes % 10/07/2022 33.6  % Final    Monocytes % 10/07/2022 9.2  % Final    Eosinophils % 10/07/2022 2.0  % Final    Basophils % 10/07/2022 0.9  % Final    Neutrophils Absolute 10/07/2022 3.6  1.7 - 7.7 K/uL Final    Lymphocytes Absolute 10/07/2022 2.2  1.0 - 5.1 K/uL Final    Monocytes Absolute 10/07/2022 0.6  0.0 - 1.3 K/uL Final    Eosinophils Absolute 10/07/2022 0.1  0.0 - 0.6 K/uL Final    Basophils Absolute 10/07/2022 0.1  0.0 - 0.2 K/uL Final    Sodium 10/07/2022 139  136 - 145 mmol/L Final    Potassium reflex Magnesium 10/07/2022 3.4 (A)  3.5 - 5.1 mmol/L Final    Chloride 10/07/2022 101  99 - 110 mmol/L Final    CO2 10/07/2022 27  21 - 32 mmol/L Final    Anion Gap 10/07/2022 11  3 - 16 Final    Glucose 10/07/2022 125 (A)  70 - 99 mg/dL Final    BUN 10/07/2022 8  7 - 20 mg/dL Final    Creatinine 10/07/2022 <0.5 (A)  0.6 - 1.1 mg/dL Final    GFR Non- 10/07/2022 >60  >60 Final    Comment: >60 mL/min/1.73m2 EGFR, calc. for ages 25 and older using the  MDRD formula (not corrected for weight), is valid for stable  renal function. GFR  10/07/2022 >60  >60 Final    Comment: Chronic Kidney Disease: less than 60 ml/min/1.73 sq.m.           Kidney Failure: less than 15 ml/min/1.73 muscle every 30 days, Disp-1 each, R-0Normal             Disposition - Residence Home     Follow Up:  See Discharge Instructions

## 2022-11-02 ENCOUNTER — FOLLOWUP TELEPHONE ENCOUNTER (OUTPATIENT)
Dept: PSYCHIATRY | Age: 32
End: 2022-11-02

## 2022-11-02 RX ORDER — LORAZEPAM 0.5 MG/1
0.5 TABLET ORAL EVERY 8 HOURS PRN
Qty: 30 TABLET | Refills: 0 | Status: SHIPPED | OUTPATIENT
Start: 2022-11-02 | End: 2022-12-02

## 2022-11-02 NOTE — TELEPHONE ENCOUNTER
Pt's  called asking when pt's next [facility] appt was. Writer informed  via   # [id number] to call Tremayne Rich at [phone] for med management.

## 2022-11-03 DIAGNOSIS — F31.30 BIPOLAR AFFECTIVE DISORDER, CURRENT EPISODE DEPRESSED, CURRENT EPISODE SEVERITY UNSPECIFIED (HCC): ICD-10-CM

## 2022-11-10 ENCOUNTER — FOLLOWUP TELEPHONE ENCOUNTER (OUTPATIENT)
Dept: PSYCHIATRY | Age: 32
End: 2022-11-10

## 2022-11-11 ENCOUNTER — HOSPITAL ENCOUNTER (OUTPATIENT)
Dept: PSYCHIATRY | Age: 32
Setting detail: THERAPIES SERIES
Discharge: HOME | End: 2022-11-11

## 2022-11-11 ENCOUNTER — HOSPITAL ENCOUNTER (OUTPATIENT)
Dept: NURSING | Age: 32
Setting detail: INFUSION SERIES
Discharge: HOME | End: 2022-11-11
Payer: MEDICAID

## 2022-11-11 VITALS — RESPIRATION RATE: 16 BRPM

## 2022-11-11 DIAGNOSIS — F31.70 BIPOLAR AFFECTIVE DISORDER IN REMISSION (HCC): Primary | ICD-10-CM

## 2022-11-11 DIAGNOSIS — F31.30 BIPOLAR AFFECTIVE DISORDER, CURRENT EPISODE DEPRESSED, CURRENT EPISODE SEVERITY UNSPECIFIED (HCC): Primary | ICD-10-CM

## 2022-11-11 PROBLEM — F29 PSYCHOSIS (HCC): Status: ACTIVE | Noted: 2022-11-11

## 2022-11-11 PROCEDURE — 99203 OFFICE O/P NEW LOW 30 MIN: CPT

## 2022-11-11 PROCEDURE — 99214 OFFICE O/P EST MOD 30 MIN: CPT | Performed by: PSYCHIATRY & NEUROLOGY

## 2022-11-11 RX ORDER — LORAZEPAM 1 MG/1
1 TABLET ORAL 2 TIMES DAILY
Status: DISCONTINUED | OUTPATIENT
Start: 2022-11-11 | End: 2022-11-12 | Stop reason: HOSPADM

## 2022-11-11 RX ORDER — LORAZEPAM 1 MG/1
1 TABLET ORAL EVERY 12 HOURS PRN
Qty: 60 TABLET | Refills: 0 | Status: SHIPPED | OUTPATIENT
Start: 2022-11-11 | End: 2022-12-11

## 2022-11-11 RX ORDER — PALIPERIDONE 6 MG/1
6 TABLET, EXTENDED RELEASE ORAL EVERY MORNING
Qty: 60 TABLET | Refills: 0 | Status: SHIPPED | OUTPATIENT
Start: 2022-11-11

## 2022-11-11 RX ORDER — PALIPERIDONE 3 MG/1
6 TABLET, EXTENDED RELEASE ORAL 2 TIMES DAILY
Status: DISCONTINUED | OUTPATIENT
Start: 2022-11-11 | End: 2022-11-12 | Stop reason: HOSPADM

## 2022-11-11 NOTE — PROGRESS NOTES
Department of Psychiatry  AttendingProgress Note  Chief Complaint: psychosis  Andreia Elizabeth came with her  for her Arjun Green injection today and she was refusing it. We discussed her current mental status as she appeared well groomed and appropriate with me. No relapse noted. She stated that she doesn't want to take the injection but was agreeable to taking the Invega po. Will dc injection. Add Invega 6 mg qd with a prn dose . Is going to [facility] in 2 weeks and returning January 2023. Patient's chart was reviewed and collaborated with  about the treatment plan. SUBJECTIVE:    Patient is feeling better. Suicidal ideation:  denies suicidal ideation. Patient does not have medication side effects. ROS: Patient has new complaints: no  Sleeping adequately:  Yes   Appetite adequate: Yes  Attending groups: No: NA  Visitors:NA    OBJECTIVE    Physical  VITALS:  LMP  (LMP Unknown)     Mental Status Examination:  Patients appearance was street clothes. Thoughts are Goal directed. Homicidal ideations none. No abnormal movements, tics or mannerisms. Memory intact Aims 0. Concentration Good. Alert and oriented X 4. Insight and Judgement normal insight and judgment. Patient was cooperative. Patient gait normal. Mood within normal limits, affect normal affect Hallucinations Absent, suicidal ideations no specific plan to harm self Speech normal volume  Data  Labs:   No visits with results within 2 Day(s) from this visit.    Latest known visit with results is:   Admission on 09/23/2022, Discharged on 10/13/2022   Component Date Value Ref Range Status    POC Glucose 09/26/2022 80  70 - 99 mg/dl Final    Performed on 09/26/2022 ACCU-CHEK   Final    WBC 09/27/2022 7.8  4.0 - 11.0 K/uL Final    RBC 09/27/2022 4.55  4.00 - 5.20 M/uL Final    Hemoglobin 09/27/2022 13.3  12.0 - 16.0 g/dL Final    Hematocrit 09/27/2022 40.0  36.0 - 48.0 % Final    MCV 09/27/2022 87.9  80.0 - 100.0 fL Final    [facility] 09/27/2022 29.3  26.0 - 34.0 pg Final    MCHC 09/27/2022 33.3  31.0 - 36.0 g/dL Final    RDW 09/27/2022 13.1  12.4 - 15.4 % Final    Platelets 44/13/7252 315  135 - 450 K/uL Final    MPV 09/27/2022 7.7  5.0 - 10.5 fL Final    Neutrophils % 09/27/2022 73.1  % Final    Lymphocytes % 09/27/2022 17.4  % Final    Monocytes % 09/27/2022 6.2  % Final    Eosinophils % 09/27/2022 0.6  % Final    Basophils % 09/27/2022 2.7  % Final    Neutrophils Absolute 09/27/2022 5.7  1.7 - 7.7 K/uL Final    Lymphocytes Absolute 09/27/2022 1.4  1.0 - 5.1 K/uL Final    Monocytes Absolute 09/27/2022 0.5  0.0 - 1.3 K/uL Final    Eosinophils Absolute 09/27/2022 0.0  0.0 - 0.6 K/uL Final    Basophils Absolute 09/27/2022 0.2  0.0 - 0.2 K/uL Final    Sodium 09/27/2022 140  136 - 145 mmol/L Final    Potassium reflex Magnesium 09/27/2022 4.1  3.5 - 5.1 mmol/L Final    Chloride 09/27/2022 105  99 - 110 mmol/L Final    CO2 09/27/2022 17 (A)  21 - 32 mmol/L Final    Anion Gap 09/27/2022 18 (A)  3 - 16 Final    Glucose 09/27/2022 95  70 - 99 mg/dL Final    BUN 09/27/2022 13  7 - 20 mg/dL Final    Creatinine 09/27/2022 <0.5 (A)  0.6 - 1.1 mg/dL Final    GFR Non- 09/27/2022 >60  >60 Final    Comment: >60 mL/min/1.73m2 EGFR, calc. for ages 25 and older using the  MDRD formula (not corrected for weight), is valid for stable  renal function. GFR  09/27/2022 >60  >60 Final    Comment: Chronic Kidney Disease: less than 60 ml/min/1.73 sq.m. Kidney Failure: less than 15 ml/min/1.73 sq.m. Results valid for patients 18 years and older.       Calcium 09/27/2022 9.6  8.3 - 10.6 mg/dL Final    POC Glucose 09/30/2022 124 (A)  70 - 99 mg/dl Final    Performed on 09/30/2022 ACCU-CHEK   Final    WBC 10/07/2022 6.6  4.0 - 11.0 K/uL Final    RBC 10/07/2022 4.19  4.00 - 5.20 M/uL Final    Hemoglobin 10/07/2022 12.4  12.0 - 16.0 g/dL Final    Hematocrit 10/07/2022 36.6  36.0 - 48.0 % Final    MCV 10/07/2022 87.2  80.0 - 100.0 fL Final MCH 10/07/2022 29.6  26.0 - 34.0 pg Final    MCHC 10/07/2022 34.0  31.0 - 36.0 g/dL Final    RDW 10/07/2022 13.0  12.4 - 15.4 % Final    Platelets 40/44/3617 221  135 - 450 K/uL Final    MPV 10/07/2022 8.4  5.0 - 10.5 fL Final    Neutrophils % 10/07/2022 54.3  % Final    Lymphocytes % 10/07/2022 33.6  % Final    Monocytes % 10/07/2022 9.2  % Final    Eosinophils % 10/07/2022 2.0  % Final    Basophils % 10/07/2022 0.9  % Final    Neutrophils Absolute 10/07/2022 3.6  1.7 - 7.7 K/uL Final    Lymphocytes Absolute 10/07/2022 2.2  1.0 - 5.1 K/uL Final    Monocytes Absolute 10/07/2022 0.6  0.0 - 1.3 K/uL Final    Eosinophils Absolute 10/07/2022 0.1  0.0 - 0.6 K/uL Final    Basophils Absolute 10/07/2022 0.1  0.0 - 0.2 K/uL Final    Sodium 10/07/2022 139  136 - 145 mmol/L Final    Potassium reflex Magnesium 10/07/2022 3.4 (A)  3.5 - 5.1 mmol/L Final    Chloride 10/07/2022 101  99 - 110 mmol/L Final    CO2 10/07/2022 27  21 - 32 mmol/L Final    Anion Gap 10/07/2022 11  3 - 16 Final    Glucose 10/07/2022 125 (A)  70 - 99 mg/dL Final    BUN 10/07/2022 8  7 - 20 mg/dL Final    Creatinine 10/07/2022 <0.5 (A)  0.6 - 1.1 mg/dL Final    GFR Non- 10/07/2022 >60  >60 Final    Comment: >60 mL/min/1.73m2 EGFR, calc. for ages 25 and older using the  MDRD formula (not corrected for weight), is valid for stable  renal function. GFR  10/07/2022 >60  >60 Final    Comment: Chronic Kidney Disease: less than 60 ml/min/1.73 sq.m. Kidney Failure: less than 15 ml/min/1.73 sq.m. Results valid for patients 18 years and older.       Calcium 10/07/2022 9.8  8.3 - 10.6 mg/dL Final    Total Protein 10/07/2022 7.0  6.4 - 8.2 g/dL Final    Albumin 10/07/2022 4.2  3.4 - 5.0 g/dL Final    Albumin/Globulin Ratio 10/07/2022 1.5  1.1 - 2.2 Final    Total Bilirubin 10/07/2022 0.4  0.0 - 1.0 mg/dL Final    Alkaline Phosphatase 10/07/2022 60  40 - 129 U/L Final    ALT 10/07/2022 78 (A)  10 - 40 U/L Final    AST 10/07/2022 61 (A)  15 - 37 U/L Final    Magnesium 10/07/2022 2.10  1.80 - 2.40 mg/dL Final            Medications  Current Facility-Administered Medications: paliperidone (INVEGA) extended release tablet 6 mg, 6 mg, Oral, BID  LORazepam (ATIVAN) tablet 1 mg, 1 mg, Oral, BID  Facility-Administered Medications Ordered in Other Encounters: paliperidone palmitate ER (INVEGA SUSTENNA) IM injection 234 mg, 234 mg, IntraMUSCular, Once    ASSESSMENT AND PLAN    Principal Problem:    Bipolar affective disorder, current episode manic with psychotic symptoms ([facility] [address]  Resolved Problems:    * No resolved hospital problems. *       1. Patient s symptoms   are improving. DC Sustenna   Add Invega 6 mg qd plus a prn dose daily. Reduce Ativan 1 mg to BID with eventual dc   2. Probable discharge is outpt   3. Discharge planning is complete  4. Suicidal ideation is  none  5. Total time with patient was 40 minutes and more than 50 % of that time was spent counseling the patient on their symptoms, treatment and expected goals.

## 2022-11-11 NOTE — PROGRESS NOTES
Pt here ambulatory with family  interpretor used as pt only speaks Tu[place]  Pt was asking why she is here  explanation was given and pt stated she does not want the injection   Pt is agreeable and asks to talk with Dr. Princess Caraballo  I spoke with Raquel Johnson RN regarding pt 's request and pt was then sent to  [facility]  Pt and Yanira Nam RN returned after 5 mins then Dr. Princess Caraballo came down and talked with pt  Pt will not get the injection today per Dr. Princess Caraballo  pt was discharge ambulatory with family in stable condition

## 2023-12-22 NOTE — GROUP NOTE
Group Therapy Note    Date: 9/30/2022    Group Start Time: 1100  Group End Time: 3933  Group Topic: Cognitive Skills    [id number] [facility]        Group Therapy Note    Attendees: 6    Group members were asked different questions and then used yarn to create a spider web. The spider web signifies a \"connection. \"      Notes:  Sandra  attended group for the full duration. Sandra's interpretor assisted her with translation. Kenneth Gonsalez was unable to engage in the activity.      Status After Intervention:  Unchanged    Participation Level: None    Participation Quality: Appropriate      Speech:  mute      Thought Process/Content: Linear      Affective Functioning: Constricted/Restricted      Level of consciousness:  Alert      Response to Learning: Resistant      Endings: None Reported    Modes of Intervention: Socialization, Exploration, and Activity      Discipline Responsible: Behavorial Health Tech      Signature:  BRII Valdovinos Patient